# Patient Record
Sex: FEMALE | Race: WHITE | NOT HISPANIC OR LATINO | Employment: UNEMPLOYED | URBAN - METROPOLITAN AREA
[De-identification: names, ages, dates, MRNs, and addresses within clinical notes are randomized per-mention and may not be internally consistent; named-entity substitution may affect disease eponyms.]

---

## 2021-09-27 ENCOUNTER — TELEPHONE (OUTPATIENT)
Dept: NEUROLOGY | Facility: CLINIC | Age: 56
End: 2021-09-27

## 2023-05-23 ENCOUNTER — HOSPITAL ENCOUNTER (EMERGENCY)
Facility: HOSPITAL | Age: 58
Discharge: HOME | End: 2023-05-23
Attending: STUDENT IN AN ORGANIZED HEALTH CARE EDUCATION/TRAINING PROGRAM
Payer: MEDICAID

## 2023-05-23 VITALS
DIASTOLIC BLOOD PRESSURE: 77 MMHG | WEIGHT: 160 LBS | HEIGHT: 60 IN | BODY MASS INDEX: 31.41 KG/M2 | TEMPERATURE: 98 F | RESPIRATION RATE: 20 BRPM | OXYGEN SATURATION: 99 % | SYSTOLIC BLOOD PRESSURE: 128 MMHG | HEART RATE: 60 BPM

## 2023-05-23 DIAGNOSIS — M20.012 MALLET FINGER, LEFT: Primary | ICD-10-CM

## 2023-05-23 PROCEDURE — 25000003 PHARM REV CODE 250: Performed by: STUDENT IN AN ORGANIZED HEALTH CARE EDUCATION/TRAINING PROGRAM

## 2023-05-23 PROCEDURE — 99283 EMERGENCY DEPT VISIT LOW MDM: CPT

## 2023-05-23 RX ORDER — ACETAMINOPHEN 500 MG
1000 TABLET ORAL
Status: COMPLETED | OUTPATIENT
Start: 2023-05-23 | End: 2023-05-23

## 2023-05-23 RX ORDER — IBUPROFEN 400 MG/1
400 TABLET ORAL
Status: COMPLETED | OUTPATIENT
Start: 2023-05-23 | End: 2023-05-23

## 2023-05-23 RX ADMIN — ACETAMINOPHEN 1000 MG: 500 TABLET, FILM COATED ORAL at 07:05

## 2023-05-23 RX ADMIN — IBUPROFEN 400 MG: 400 TABLET ORAL at 07:05

## 2023-05-24 NOTE — ED PROVIDER NOTES
Encounter Date: 5/23/2023       History   No chief complaint on file.    57-year-old female presents for acute onset left 5th DI P pain after being pushed around by her dog on a leash.  She does not remember the exact mechanism of the injury.  The pain is worse when her fingers held in flexion, but improves when she forces the DI P into extension with a splint.  She denies other injuries.    Review of patient's allergies indicates:  No Known Allergies  Past Medical History:   Diagnosis Date    Diverticulitis      Past Surgical History:   Procedure Laterality Date    hemorroid surgery      HYSTERECTOMY      SHOULDER SURGERY       No family history on file.  Social History     Tobacco Use    Smoking status: Every Day     Packs/day: 1.00     Years: 35.00     Pack years: 35.00     Types: Cigarettes   Substance Use Topics    Alcohol use: No    Drug use: No     Review of Systems   Constitutional:  Negative for activity change, appetite change, chills, fever and unexpected weight change.   HENT:  Negative for dental problem and drooling.    Eyes:  Negative for discharge and itching.   Respiratory:  Negative for cough, chest tightness, shortness of breath, wheezing and stridor.    Cardiovascular:  Negative for chest pain, palpitations and leg swelling.   Gastrointestinal:  Negative for abdominal distention, abdominal pain, diarrhea and nausea.   Genitourinary:  Negative for difficulty urinating, dysuria, frequency and urgency.   Musculoskeletal:  Positive for arthralgias. Negative for back pain, gait problem and joint swelling.   Neurological:  Negative for dizziness, syncope, numbness and headaches.   Psychiatric/Behavioral:  Negative for agitation, behavioral problems and confusion.      Physical Exam     Initial Vitals [05/23/23 1903]   BP Pulse Resp Temp SpO2   (!) 121/100 79 20 99.4 °F (37.4 °C) 96 %      MAP       --         Physical Exam    Nursing note and vitals reviewed.  Constitutional: She appears well-developed  and well-nourished. She is not diaphoretic.   HENT:   Head: Normocephalic and atraumatic.   Mouth/Throat: Oropharynx is clear and moist.   Eyes: EOM are normal. Pupils are equal, round, and reactive to light. Right eye exhibits no discharge. Left eye exhibits no discharge.   Neck: No tracheal deviation present.   Normal range of motion.  Cardiovascular:  Normal rate, regular rhythm and intact distal pulses.           Pulmonary/Chest: No respiratory distress. She has no wheezes. She exhibits no tenderness.   Abdominal: Abdomen is soft. She exhibits no distension. There is no abdominal tenderness.   Musculoskeletal:         General: Tenderness present. No edema. Normal range of motion.      Cervical back: Normal range of motion.     Neurological: She is alert and oriented to person, place, and time. She has normal strength. No cranial nerve deficit or sensory deficit. GCS eye subscore is 4. GCS verbal subscore is 5. GCS motor subscore is 6.   Skin: Skin is warm and dry. No rash noted.   Psychiatric: She has a normal mood and affect. Her behavior is normal. Thought content normal.       ED Course   Procedures  Labs Reviewed - No data to display       Imaging Results              X-Ray Hand 3 view Left (Final result)  Result time 05/23/23 19:39:01      Final result by Sue Rocha DO (05/23/23 19:39:01)                   Narrative:    Left hand radiographs: 5/23/2023 7:38 PM CDT    TECHNIQUE: AP, lateral, oblique images of the left hand were obtained.    History: 57 years  old Female with Injury.    COMPARISON: None available    FINDINGS: The carpal arcs appear to be intact. The soft tissues are grossly unremarkable. There are no findings to suggest an acute fracture within the left hand. No gross radiopaque foreign body is seen. There is flexion of the left fifth digit which could be due to patient positioning but can also be seen with ligamentous injury.    IMPRESSION: There are no findings to suggest an acute  fracture within the left hand.        Electronically signed by:  Sue Rocha DO  5/23/2023 7:39 PM CDT Workstation: [id number]                                     Medications   acetaminophen tablet 1,000 mg (1,000 mg Oral Given 5/23/23 1922)   ibuprofen tablet 400 mg (400 mg Oral Given 5/23/23 1922)                            Left 5th pinky DI P held in slight flexion, patient unable to extend it.  Vitals normal.  No other injuries.  X-ray without evidence of acute fracture.  Injury pattern and exam most consistent with mallet finger.  Patient placed in splint, advised to follow up with hand surgery and to keep the finger in extension for 6-8 weeks.    Clinical Impression:   Final diagnoses:  [M20.012] Mallet finger, left (Primary)        ED Disposition Condition    Discharge Stable          ED Prescriptions    None       Follow-up Information       Follow up With Specialties Details Why Contact Info    Paul Nguyen MD Hand Surgery, Orthopedic Surgery Call in 1 day To set up a follow-up appointment, To recheck today's symptoms [address] [place] [address] Drive  [facility] [address] [phone]               Kiran Dean MD  05/24/23 3963

## 2023-05-24 NOTE — DISCHARGE INSTRUCTIONS
You have an injury to the extensor tendon of the pinky finger of your left hand. It's called mallet finger. Google Mallet Finger splints and purchase one that keeps your finger in extension for 6-8 weeks. Do not allow your finger to bend, even when you shower. Follow up with a hand surgeon for further evaluation.    Thank you for coming to our Emergency Department today. It is important to remember that some problems or medical conditions are difficult to diagnose and may not be found during your Emergency Department visit.     Be sure to follow up with your primary care doctor and review all labs/imaging/tests that were performed during your ER visit with them. Some labs/tests may be outside of the normal range and require non-emergent follow-up and further investigation to help diagnose/exclude/prevent complications or other potentially serious medical conditions that were not addressed during your ER visit.    If you do not have a primary care doctor, you may contact the one listed on your discharge paperwork or you may also call the [facility] Appointment Desk at [phone] to schedule an appointment and establish care with one. Another resources for finding primary care physicians: [email or web address] It is important to your health that you have a primary care doctor.    Please take all medications as directed. All medications may potentially have side-effects and it is impossible to predict which medications may give you side-effects or what side-effects (if any) they will give you. If you feel that you are having a negative effect or side-effect of any medication you should immediately stop taking them and seek medical attention. If you feel that you are having a life-threatening reaction call 911.    Return to the ER with any questions/concerns, new/concerning symptoms, worsening or failure to improve.     Do not drive, swim, climb to height, take a bath, operate heavy machinery, drink alcohol or  take potentially sedating medications, sign any legal documents or make any important decisions for 24 hours if you have received any pain medications, sedatives or mood altering drugs during your ER visit or within 24 hours of taking them if they have been prescribed to you.     You can find additional resources for Dentists, hearing aids, durable medical equipment, low cost pharmacies and other resources at [email or web address]

## 2023-10-22 ENCOUNTER — HOSPITAL ENCOUNTER (EMERGENCY)
Facility: HOSPITAL | Age: 58
Discharge: HOME | End: 2023-10-22
Attending: EMERGENCY MEDICINE
Payer: MEDICAID

## 2023-10-22 VITALS
TEMPERATURE: 98 F | WEIGHT: 147 LBS | BODY MASS INDEX: 28.71 KG/M2 | HEART RATE: 62 BPM | RESPIRATION RATE: 20 BRPM | OXYGEN SATURATION: 97 % | SYSTOLIC BLOOD PRESSURE: 171 MMHG | DIASTOLIC BLOOD PRESSURE: 87 MMHG

## 2023-10-22 DIAGNOSIS — K52.9 GASTROENTERITIS: Primary | ICD-10-CM

## 2023-10-22 LAB
ALBUMIN SERPL BCP-MCNC: 4.8 G/DL (ref 3.5–5.2)
ALP SERPL-CCNC: 89 U/L (ref 55–135)
ALT SERPL W/O P-5'-P-CCNC: 21 U/L (ref 10–44)
ANION GAP SERPL CALC-SCNC: 6 MMOL/L (ref 8–16)
AST SERPL-CCNC: 17 U/L (ref 10–40)
BASOPHILS # BLD AUTO: 0.06 K/UL (ref 0–0.2)
BASOPHILS NFR BLD: 0.6 % (ref 0–1.9)
BILIRUB SERPL-MCNC: 0.5 MG/DL (ref 0.1–1)
BILIRUB UR QL STRIP: NEGATIVE
BUN SERPL-MCNC: 13 MG/DL (ref 6–20)
CALCIUM SERPL-MCNC: 10 MG/DL (ref 8.7–10.5)
CHLORIDE SERPL-SCNC: 108 MMOL/L (ref 95–110)
CLARITY UR: CLEAR
CO2 SERPL-SCNC: 26 MMOL/L (ref 23–29)
COLOR UR: YELLOW
CREAT SERPL-MCNC: 0.7 MG/DL (ref 0.5–1.4)
CREAT SERPL-MCNC: 0.8 MG/DL (ref 0.5–1.4)
DIFFERENTIAL METHOD: NORMAL
EOSINOPHIL # BLD AUTO: 0.2 K/UL (ref 0–0.5)
EOSINOPHIL NFR BLD: 1.5 % (ref 0–8)
ERYTHROCYTE [DISTWIDTH] IN BLOOD BY AUTOMATED COUNT: 13.5 % (ref 11.5–14.5)
EST. GFR  (NO RACE VARIABLE): >60 ML/MIN/1.73 M^2
GLUCOSE SERPL-MCNC: 108 MG/DL (ref 70–110)
GLUCOSE UR QL STRIP: NEGATIVE
HCT VFR BLD AUTO: 47.9 % (ref 37–48.5)
HGB BLD-MCNC: 16 G/DL (ref 12–16)
HGB UR QL STRIP: NEGATIVE
IMM GRANULOCYTES # BLD AUTO: 0.03 K/UL (ref 0–0.04)
IMM GRANULOCYTES NFR BLD AUTO: 0.3 % (ref 0–0.5)
KETONES UR QL STRIP: NEGATIVE
LEUKOCYTE ESTERASE UR QL STRIP: NEGATIVE
LIPASE SERPL-CCNC: 34 U/L (ref 4–60)
LYMPHOCYTES # BLD AUTO: 3.5 K/UL (ref 1–4.8)
LYMPHOCYTES NFR BLD: 33.1 % (ref 18–48)
MAGNESIUM SERPL-MCNC: 1.8 MG/DL (ref 1.6–2.6)
MCH RBC QN AUTO: 30.9 PG (ref 27–31)
MCHC RBC AUTO-ENTMCNC: 33.4 G/DL (ref 32–36)
MCV RBC AUTO: 93 FL (ref 82–98)
MONOCYTES # BLD AUTO: 0.8 K/UL (ref 0.3–1)
MONOCYTES NFR BLD: 7.4 % (ref 4–15)
NEUTROPHILS # BLD AUTO: 6 K/UL (ref 1.8–7.7)
NEUTROPHILS NFR BLD: 57.1 % (ref 38–73)
NITRITE UR QL STRIP: NEGATIVE
NRBC BLD-RTO: 0 /100 WBC
PH UR STRIP: 6 [PH] (ref 5–8)
PLATELET # BLD AUTO: 351 K/UL (ref 150–450)
PMV BLD AUTO: 10.1 FL (ref 9.2–12.9)
POTASSIUM SERPL-SCNC: 4.1 MMOL/L (ref 3.5–5.1)
PROT SERPL-MCNC: 8 G/DL (ref 6–8.4)
PROT UR QL STRIP: NEGATIVE
RBC # BLD AUTO: 5.18 M/UL (ref 4–5.4)
SAMPLE: NORMAL
SODIUM SERPL-SCNC: 140 MMOL/L (ref 136–145)
SP GR UR STRIP: 1.01 (ref 1–1.03)
URN SPEC COLLECT METH UR: NORMAL
UROBILINOGEN UR STRIP-ACNC: NEGATIVE EU/DL
WBC # BLD AUTO: 10.5 K/UL (ref 3.9–12.7)

## 2023-10-22 PROCEDURE — 25500020 PHARM REV CODE 255: Performed by: EMERGENCY MEDICINE

## 2023-10-22 PROCEDURE — 83735 ASSAY OF MAGNESIUM: CPT | Performed by: EMERGENCY MEDICINE

## 2023-10-22 PROCEDURE — 80053 COMPREHEN METABOLIC PANEL: CPT | Performed by: EMERGENCY MEDICINE

## 2023-10-22 PROCEDURE — 96360 HYDRATION IV INFUSION INIT: CPT

## 2023-10-22 PROCEDURE — 85025 COMPLETE CBC W/AUTO DIFF WBC: CPT | Performed by: EMERGENCY MEDICINE

## 2023-10-22 PROCEDURE — 99285 EMERGENCY DEPT VISIT HI MDM: CPT | Mod: 25

## 2023-10-22 PROCEDURE — 83690 ASSAY OF LIPASE: CPT | Performed by: EMERGENCY MEDICINE

## 2023-10-22 PROCEDURE — 25000003 PHARM REV CODE 250: Performed by: EMERGENCY MEDICINE

## 2023-10-22 PROCEDURE — 82565 ASSAY OF CREATININE: CPT | Mod: 59

## 2023-10-22 PROCEDURE — 81003 URINALYSIS AUTO W/O SCOPE: CPT | Performed by: EMERGENCY MEDICINE

## 2023-10-22 RX ADMIN — SODIUM CHLORIDE 1000 ML: 0.9 INJECTION, SOLUTION INTRAVENOUS at 11:10

## 2023-10-22 RX ADMIN — IOHEXOL 100 ML: 350 INJECTION, SOLUTION INTRAVENOUS at 12:10

## 2023-10-22 NOTE — ED PROVIDER NOTES
"Encounter Date: 10/22/2023       History     Chief Complaint   Patient presents with    Hematuria    Abdominal Pain     Reports "dark, bad smelling" urine since Tuesday. Lower Abd pain as well.      58-year-old female presents emergency department with complaint of noticing blood in her urine earlier in the week, and watery brown diarrhea for the last week.  Patient denies any associated nausea vomiting or fevers.  Patient is unsure of what makes symptoms better or worse.  Reports that she has had a previous history of kidney stones and pyelonephritis.      Review of patient's allergies indicates:  No Known Allergies  Past Medical History:   Diagnosis Date    Diverticulitis      Past Surgical History:   Procedure Laterality Date    hemorroid surgery      HYSTERECTOMY      SHOULDER SURGERY       No family history on file.  Social History     Tobacco Use    Smoking status: Every Day     Current packs/day: 1.00     Average packs/day: 1 pack/day for 35.0 years (35.0 ttl pk-yrs)     Types: Cigarettes   Substance Use Topics    Alcohol use: No    Drug use: No     Review of Systems   Constitutional:  Negative for fever.   HENT: Negative.     Respiratory: Negative.     Cardiovascular: Negative.    Gastrointestinal:  Positive for abdominal pain and diarrhea.   Genitourinary:  Positive for hematuria.   Musculoskeletal: Negative.    Hematological: Negative.    All other systems reviewed and are negative.      Physical Exam     Initial Vitals [10/22/23 1041]   BP Pulse Resp Temp SpO2   (!) 181/98 65 20 98.4 °F (36.9 °C) 98 %      MAP       --         Physical Exam    Nursing note and vitals reviewed.  Constitutional: She appears well-developed and well-nourished.   HENT:   Head: Normocephalic and atraumatic.   Right Ear: External ear normal.   Left Ear: External ear normal.   Eyes: Conjunctivae and EOM are normal. Pupils are equal, round, and reactive to light.   Neck:   Normal range of motion.  Cardiovascular:  Normal rate, " regular rhythm and intact distal pulses.           Pulmonary/Chest: Breath sounds normal.   Abdominal: Abdomen is soft. Bowel sounds are normal. She exhibits no distension. There is abdominal tenderness.   Tenderness to the left upper quadrant and left lower quadrant on exam no abdominal distension bowel sounds normal x4 quads   Musculoskeletal:         General: Normal range of motion.      Cervical back: Normal range of motion.     Neurological: She is alert and oriented to person, place, and time. She has normal strength.   Skin: Skin is warm.         ED Course   Procedures  Labs Reviewed   COMPREHENSIVE METABOLIC PANEL - Abnormal; Notable for the following components:       Result Value    Anion Gap 6 (*)     All other components within normal limits   URINALYSIS, REFLEX TO URINE CULTURE    Narrative:     Specimen Source->Urine   CBC W/ AUTO DIFFERENTIAL   LIPASE   MAGNESIUM   ISTAT CREATININE          Imaging Results              CT Abdomen Pelvis With Contrast (Final result)  Result time 10/22/23 12:57:55      Final result by Yamel Hampton MD (10/22/23 12:57:55)                   Narrative:    EXAMINATION:  CT ABDOMEN PELVIS WITH CONTRAST    CLINICAL INDICATION: Female, 58 years old. Abdominal pain, hematuria,    TECHNIQUE: Helical CT scan examination of the abdomen and pelvis is performed from the domes of the diaphragm to the pubic symphysis with the administration of intravenous contrast material.    CONTRAST: 100 mL mL of Omnipaque 350 IV.    COMPARISON: None    FINDINGS:  Lower Chest: Visualized lung bases are clear. Heart is normal in size. No pericardial or pleural effusion.    Liver: Normal in size and contour. No focal lesion.    Bile Ducts: Normal caliber.    Gallbladder: No stones, wall thickening, or pericholecystic fluid.    Pancreas: Normal appearance without focal lesion.    Spleen: Normal in size and contour.    Adrenals: Normal configuration.    Kidneys and Ureters: Normal size and  contour. No hydronephrosis.    Bladder: Normal in appearance.    Bowel:  Stomach: Unremarkable.  Small Intestine: Unremarkable.  Appendix: No inflammatory changes.  Colon: Unremarkable.    Vessels: Abdominal aorta and inferior vena cava are normal in course and caliber.    Reproductive Organs: Uterus is absent.    Lymph Nodes: No pathologic mesenteric or retroperitoneal lymph nodes.    Peritoneum: No free air, free fluid, or fluid collection.    Abdominal Wall: No hernia or mass.    Musculoskeletal: No acute abnormality or suspicious bony lesion.    IMPRESSION:  No acute or significant abnormality seen in the abdomen or pelvis  Prior hysterectomy.    This exam was performed according to our departmental dose-optimization program which includes automated exposure control, adjustment of the mA and/or kV according to patient size and/or use of iterative reconstruction technique.    Electronically signed by:  Yamel Hampton MD  10/22/2023 12:57 PM CDT Workstation: [id number]                                     Medications   sodium chloride 0.9% bolus 1,000 mL 1,000 mL (0 mLs Intravenous Stopped 10/22/23 1210)   iohexoL (OMNIPAQUE 350) injection 100 mL (100 mLs Intravenous Given 10/22/23 1222)     Medical Decision Making  58-year-old female presents emergency department with complaint of noticing blood in her urine earlier in the week, and watery brown diarrhea for the last week.  Patient denies any associated nausea vomiting or fevers.  Patient is unsure of what makes symptoms better or worse.  Reports that she has had a previous history of kidney stones and pyelonephritis.    Considerations include colitis, pyelonephritis, UTI, ureterolithiasis, bowel obstruction, diverticulitis, electrolyte abnormalities    58-year-old female presents emergency department complaint of hematuria earlier in the week, low back pain, and abdominal pain.  Patient reports that she is had loose stools denies any bloody or mucousy stools.   On exam patient has tenderness to the left upper quadrant and left lower quadrant therefore further evaluation was performed with labs which were unremarkable urinalysis reveals no signs of infection or occult blood suggestive of infection or ureterolithiasis.  CT imaging was performed and again unremarkable.  Patient's exam and labs are completely unremarkable.  I will discharge this patient home at this time that she is not had a bowel movement to send for stool studies of given detailed return precautions patient was instructed on brat  diet follow-up with pcp     Amount and/or Complexity of Data Reviewed  External Data Reviewed: labs and notes.  Labs: ordered. Decision-making details documented in ED Course.  Radiology: ordered. Decision-making details documented in ED Course.    Risk  Prescription drug management.                               Clinical Impression:   Final diagnoses:  [K52.9] Gastroenteritis (Primary)        ED Disposition Condition    Discharge Stable          ED Prescriptions    None       Follow-up Information       Follow up With Specialties Details Why Contact Info    Meliton Valadez MD Family Medicine Schedule an appointment as soon as possible for a visit in 2 days  [address] [address]  [phone]               Aissatou Bran FNP  10/22/23 1557

## 2023-11-30 ENCOUNTER — HOSPITAL ENCOUNTER (EMERGENCY)
Facility: HOSPITAL | Age: 58
Discharge: HOME | End: 2023-11-30
Attending: EMERGENCY MEDICINE
Payer: MEDICAID

## 2023-11-30 VITALS
TEMPERATURE: 98 F | BODY MASS INDEX: 28.32 KG/M2 | OXYGEN SATURATION: 96 % | SYSTOLIC BLOOD PRESSURE: 126 MMHG | DIASTOLIC BLOOD PRESSURE: 71 MMHG | WEIGHT: 145 LBS | HEART RATE: 69 BPM | RESPIRATION RATE: 17 BRPM

## 2023-11-30 DIAGNOSIS — R51.9 NONINTRACTABLE HEADACHE, UNSPECIFIED CHRONICITY PATTERN, UNSPECIFIED HEADACHE TYPE: Primary | ICD-10-CM

## 2023-11-30 LAB
ALBUMIN SERPL BCP-MCNC: 4.7 G/DL (ref 3.5–5.2)
ALP SERPL-CCNC: 87 U/L (ref 55–135)
ALT SERPL W/O P-5'-P-CCNC: 16 U/L (ref 10–44)
ANION GAP SERPL CALC-SCNC: 5 MMOL/L (ref 8–16)
AST SERPL-CCNC: 15 U/L (ref 10–40)
BASOPHILS # BLD AUTO: 0.06 K/UL (ref 0–0.2)
BASOPHILS NFR BLD: 0.6 % (ref 0–1.9)
BILIRUB SERPL-MCNC: 0.5 MG/DL (ref 0.1–1)
BUN SERPL-MCNC: 12 MG/DL (ref 6–20)
CALCIUM SERPL-MCNC: 9.8 MG/DL (ref 8.7–10.5)
CHLORIDE SERPL-SCNC: 106 MMOL/L (ref 95–110)
CO2 SERPL-SCNC: 27 MMOL/L (ref 23–29)
CREAT SERPL-MCNC: 0.7 MG/DL (ref 0.5–1.4)
CREAT SERPL-MCNC: 0.8 MG/DL (ref 0.5–1.4)
DIFFERENTIAL METHOD: ABNORMAL
EOSINOPHIL # BLD AUTO: 0.2 K/UL (ref 0–0.5)
EOSINOPHIL NFR BLD: 1.7 % (ref 0–8)
ERYTHROCYTE [DISTWIDTH] IN BLOOD BY AUTOMATED COUNT: 13.7 % (ref 11.5–14.5)
EST. GFR  (NO RACE VARIABLE): >60 ML/MIN/1.73 M^2
GLUCOSE SERPL-MCNC: 106 MG/DL (ref 70–110)
HCT VFR BLD AUTO: 49.7 % (ref 37–48.5)
HGB BLD-MCNC: 16.5 G/DL (ref 12–16)
IMM GRANULOCYTES # BLD AUTO: 0.02 K/UL (ref 0–0.04)
IMM GRANULOCYTES NFR BLD AUTO: 0.2 % (ref 0–0.5)
LYMPHOCYTES # BLD AUTO: 3.7 K/UL (ref 1–4.8)
LYMPHOCYTES NFR BLD: 38.3 % (ref 18–48)
MCH RBC QN AUTO: 30.3 PG (ref 27–31)
MCHC RBC AUTO-ENTMCNC: 33.2 G/DL (ref 32–36)
MCV RBC AUTO: 91 FL (ref 82–98)
MONOCYTES # BLD AUTO: 0.7 K/UL (ref 0.3–1)
MONOCYTES NFR BLD: 7.3 % (ref 4–15)
NEUTROPHILS # BLD AUTO: 5 K/UL (ref 1.8–7.7)
NEUTROPHILS NFR BLD: 51.9 % (ref 38–73)
NRBC BLD-RTO: 0 /100 WBC
PLATELET # BLD AUTO: 363 K/UL (ref 150–450)
PMV BLD AUTO: 9.7 FL (ref 9.2–12.9)
POTASSIUM SERPL-SCNC: 4 MMOL/L (ref 3.5–5.1)
PROT SERPL-MCNC: 7.8 G/DL (ref 6–8.4)
RBC # BLD AUTO: 5.44 M/UL (ref 4–5.4)
SAMPLE: NORMAL
SODIUM SERPL-SCNC: 138 MMOL/L (ref 136–145)
WBC # BLD AUTO: 9.62 K/UL (ref 3.9–12.7)

## 2023-11-30 PROCEDURE — 99285 EMERGENCY DEPT VISIT HI MDM: CPT | Mod: 25

## 2023-11-30 PROCEDURE — 82565 ASSAY OF CREATININE: CPT | Mod: 59

## 2023-11-30 PROCEDURE — 25500020 PHARM REV CODE 255: Performed by: EMERGENCY MEDICINE

## 2023-11-30 PROCEDURE — 80053 COMPREHEN METABOLIC PANEL: CPT | Performed by: EMERGENCY MEDICINE

## 2023-11-30 PROCEDURE — 85025 COMPLETE CBC W/AUTO DIFF WBC: CPT | Performed by: EMERGENCY MEDICINE

## 2023-11-30 RX ORDER — BUTALBITAL, ACETAMINOPHEN AND CAFFEINE 50; 325; 40 MG/1; MG/1; MG/1
1 TABLET ORAL EVERY 4 HOURS PRN
Qty: 20 TABLET | Refills: 0 | Status: SHIPPED | OUTPATIENT
Start: 2023-11-30

## 2023-11-30 RX ADMIN — IOHEXOL 50 ML: 350 INJECTION, SOLUTION INTRAVENOUS at 11:11

## 2023-11-30 NOTE — ED PROVIDER NOTES
Encounter Date: 11/30/2023       History     Chief Complaint   Patient presents with    Headache     X 2 weeks. History of craniotomy/tumor removal 10/2022      Patient presents complaining of headache.  Patient has history of craniotomy for meningioma removal in 2022 by Dr. Neves.  Patient noticed increasing tenderness to that area over the last 2 weeks.  No nausea or vomiting.  No one-sided numbness tingling or weakness.  She is noticed some pain behind the right eye.  The loss of vision.      Review of patient's allergies indicates:  No Known Allergies  Past Medical History:   Diagnosis Date    Diverticulitis      Past Surgical History:   Procedure Laterality Date    hemorroid surgery      HYSTERECTOMY      SHOULDER SURGERY       No family history on file.  Social History     Tobacco Use    Smoking status: Every Day     Current packs/day: 1.00     Average packs/day: 1 pack/day for 35.0 years (35.0 ttl pk-yrs)     Types: Cigarettes   Substance Use Topics    Alcohol use: No    Drug use: No     Review of Systems   All other systems reviewed and are negative.      Physical Exam     Initial Vitals [11/30/23 0958]   BP Pulse Resp Temp SpO2   131/68 73 20 98.1 °F (36.7 °C) 98 %      MAP       --         Physical Exam    Nursing note and vitals reviewed.  Constitutional: She appears well-developed and well-nourished.   Pleasant, polite   HENT:   Head: Normocephalic and atraumatic.   Eyes: EOM are normal.   Neck: Neck supple.   Normal range of motion.  Cardiovascular:  Normal rate, regular rhythm, normal heart sounds and intact distal pulses.           Pulmonary/Chest: Breath sounds normal. No respiratory distress.   Musculoskeletal:         General: Normal range of motion.      Cervical back: Normal range of motion and neck supple.     Neurological: She is alert and oriented to person, place, and time. She has normal strength.   Vision - Normal  Aphasia - Normal  Neglect - Normal  Pronator drift - Normal  Cerebellum -  "Normal  RUE strength - Normal  RLE strength - Normal  LUE strength - Normal  LLE strength - Normal   Skin: Skin is warm and dry. Capillary refill takes less than 2 seconds.   Psychiatric: She has a normal mood and affect. Her behavior is normal. Judgment and thought content normal.         ED Course   Procedures  Labs Reviewed   CBC W/ AUTO DIFFERENTIAL - Abnormal; Notable for the following components:       Result Value    RBC 5.44 (*)     Hemoglobin 16.5 (*)     Hematocrit 49.7 (*)     All other components within normal limits   COMPREHENSIVE METABOLIC PANEL - Abnormal; Notable for the following components:    Anion Gap 5 (*)     All other components within normal limits   ISTAT CREATININE   POCT CREATININE          Imaging Results              CT Head W Wo Contrast (Final result)  Result time 11/30/23 12:13:22      Final result by Edis Morgan MD (11/30/23 12:13:22)                   Narrative:    CMS MANDATED QUALITY DATA - CT RADIATION - 436    All CT scans at this facility utilize dose modulation, iterative reconstruction, and/or weight based dosing when appropriate to reduce radiation dose to as low as reasonably achievable.          Reason: Meningitis/CNS infection suspected reported headache for 2 weeks, history of craniotomy and "tumor removal" in 2022    TECHNIQUE: Head CT without and with 50 mL Omnipaque 350.    COMPARISON: 8/18/2018    FINDINGS:  Gray-white differentiation is maintained without hemorrhage, midline shift, or mass effect.    Postsurgical changes of right posterior parasagittal craniotomy evident. Focal encephalomalacia affects the midline superior right parietal lobe.    Following IV contrast, no abnormal intra-axial or extra-axial enhancement occurs.    The ventricles and cisterns are maintained.    Visualized sinuses are clear.    IMPRESSION:    1. No acute intracranial abnormality.  2. Postsurgical changes of right posterior parasagittal craniotomy with focal right frontal lobe " encephalomalacia deep to such.    Electronically signed by:  Edis Morgan MD  11/30/2023 12:13 PM [facility] Workstation: [id number]                                     Medications   iohexoL (OMNIPAQUE 350) injection 50 mL (50 mLs Intravenous Given 11/30/23 1131)     Medical Decision Making  Patient appears in no acute distress     Considerations include but are not limited to cerebral edema, infection, tumor     MDM    Patient presents for emergent evaluation of acute head pain that poses a threat to life and/or bodily function.    In the ED patient found to have acute head pain.    I ordered labs and personally reviewed them.  Labs significant for no acute abnormality.    I ordered CT scan and personally reviewed it and reviewed the radiologist interpretation.  CT significant for no acute abnormality.      Discharge MDM  I attempted to contact Dr. Donnelly group however unfortunately he is unavailable.  I advised patient's CT imaging with contrast is within normal limits and white blood cell count is normal.  I advised her she should follow up with Neurosurgery within the next few days and discuss her head pain with Dr. Donnelly .  Patient also requested that I put in a referral for a different neurosurgeon as she is had difficulty getting the appointments with  .  At this time I find patient is safe for discharge in no other emergent condition.  She will need follow up.  Patient was discharged in stable condition.  Detailed return precautions discussed.    Amount and/or Complexity of Data Reviewed  Labs: ordered.  Radiology: ordered.    Risk  Prescription drug management.                                      Clinical Impression:  Final diagnoses:  [R51.9] Nonintractable headache, unspecified chronicity pattern, unspecified headache type (Primary)          ED Disposition Condition    Discharge Stable          ED Prescriptions       Medication Sig Dispense Start Date End Date Auth. Provider     butalbital-acetaminophen-caffeine -40 mg (FIORICET, ESGIC) -40 mg per tablet Take 1 tablet by mouth every 4 (four) hours as needed for Pain. 20 tablet 11/30/2023 -- Hira Vicente MD          Follow-up Information       Follow up With Specialties Details Why Contact Info    Adrian Gray MD Neurosurgery Schedule an appointment as soon as possible for a visit in 2 days  [address] [place] [address]  [id number]  [facility]  [place] [id number] [phone]      Tony Nina, DO Neurosurgery Schedule an appointment as soon as possible for a visit in 1 week  [address] [place] [address] [address]  [address]  [facility] [address]  [phone]               Hira Vicente MD  11/30/23 3703

## 2023-12-14 ENCOUNTER — TELEPHONE (OUTPATIENT)
Dept: NEUROSURGERY | Facility: CLINIC | Age: 58
End: 2023-12-14
Payer: MEDICAID

## 2023-12-14 NOTE — TELEPHONE ENCOUNTER
Returned call to pt and informed that unfortunately, we are unable to schedule her insurance for new patients at this time. Pt voiced understanding.

## 2023-12-14 NOTE — TELEPHONE ENCOUNTER
----- Message from Jerry Rodarte sent at 12/14/2023  4:00 PM CST -----  Type: Need Medical Advice  Who Called:Patient   Best callback number: [phone]  Additional Information: Patient called to schedule a hospital f/u  appointment to see Dr Nina  Please call to further assist, Thanks.

## 2024-07-19 ENCOUNTER — HOSPITAL ENCOUNTER (INPATIENT)
Facility: HOSPITAL | Age: 59
LOS: 3 days | Discharge: HOME | DRG: 392 | End: 2024-07-22
Attending: EMERGENCY MEDICINE | Admitting: INTERNAL MEDICINE
Payer: MEDICAID

## 2024-07-19 DIAGNOSIS — R10.10 UPPER ABDOMINAL PAIN: ICD-10-CM

## 2024-07-19 DIAGNOSIS — R07.9 CHEST PAIN: ICD-10-CM

## 2024-07-19 DIAGNOSIS — K52.9 ACUTE COLITIS: Primary | ICD-10-CM

## 2024-07-19 PROBLEM — I10 ESSENTIAL HYPERTENSION: Status: ACTIVE | Noted: 2024-07-19

## 2024-07-19 LAB
ALBUMIN SERPL BCP-MCNC: 4.7 G/DL (ref 3.5–5.2)
ALP SERPL-CCNC: 106 U/L (ref 55–135)
ALT SERPL W/O P-5'-P-CCNC: 20 U/L (ref 10–44)
ANION GAP SERPL CALC-SCNC: 9 MMOL/L (ref 8–16)
AST SERPL-CCNC: 16 U/L (ref 10–40)
BASOPHILS # BLD AUTO: 0.05 K/UL (ref 0–0.2)
BASOPHILS NFR BLD: 0.3 % (ref 0–1.9)
BILIRUB SERPL-MCNC: 0.4 MG/DL (ref 0.1–1)
BNP SERPL-MCNC: 46 PG/ML (ref 0–99)
BUN SERPL-MCNC: 10 MG/DL (ref 6–20)
C DIFF GDH STL QL: NEGATIVE
C DIFF TOX A+B STL QL IA: NEGATIVE
CALCIUM SERPL-MCNC: 10.1 MG/DL (ref 8.7–10.5)
CHLORIDE SERPL-SCNC: 107 MMOL/L (ref 95–110)
CK SERPL-CCNC: 48 U/L (ref 20–180)
CO2 SERPL-SCNC: 26 MMOL/L (ref 23–29)
CREAT SERPL-MCNC: 0.7 MG/DL (ref 0.5–1.4)
DIFFERENTIAL METHOD BLD: ABNORMAL
EOSINOPHIL # BLD AUTO: 0 K/UL (ref 0–0.5)
EOSINOPHIL NFR BLD: 0.2 % (ref 0–8)
ERYTHROCYTE [DISTWIDTH] IN BLOOD BY AUTOMATED COUNT: 14 % (ref 11.5–14.5)
EST. GFR  (NO RACE VARIABLE): >60 ML/MIN/1.73 M^2
GLUCOSE SERPL-MCNC: 129 MG/DL (ref 70–110)
GROUP A STREP, MOLECULAR: NEGATIVE
HCT VFR BLD AUTO: 50.7 % (ref 37–48.5)
HGB BLD-MCNC: 16.6 G/DL (ref 12–16)
IMM GRANULOCYTES # BLD AUTO: 0.05 K/UL (ref 0–0.04)
IMM GRANULOCYTES NFR BLD AUTO: 0.3 % (ref 0–0.5)
INFLUENZA A, MOLECULAR: NEGATIVE
INFLUENZA B, MOLECULAR: NEGATIVE
LDH SERPL L TO P-CCNC: 0.86 MMOL/L (ref 0.5–2.2)
LIPASE SERPL-CCNC: 17 U/L (ref 4–60)
LYMPHOCYTES # BLD AUTO: 2.2 K/UL (ref 1–4.8)
LYMPHOCYTES NFR BLD: 14.5 % (ref 18–48)
MAGNESIUM SERPL-MCNC: 1.8 MG/DL (ref 1.6–2.6)
MCH RBC QN AUTO: 29.6 PG (ref 27–31)
MCHC RBC AUTO-ENTMCNC: 32.7 G/DL (ref 32–36)
MCV RBC AUTO: 91 FL (ref 82–98)
MONOCYTES # BLD AUTO: 0.7 K/UL (ref 0.3–1)
MONOCYTES NFR BLD: 4.3 % (ref 4–15)
NEUTROPHILS # BLD AUTO: 12.2 K/UL (ref 1.8–7.7)
NEUTROPHILS NFR BLD: 80.4 % (ref 38–73)
NRBC BLD-RTO: 0 /100 WBC
OB PNL STL: POSITIVE
PLATELET # BLD AUTO: 383 K/UL (ref 150–450)
PMV BLD AUTO: 10.4 FL (ref 9.2–12.9)
POTASSIUM SERPL-SCNC: 4 MMOL/L (ref 3.5–5.1)
PROT SERPL-MCNC: 8.1 G/DL (ref 6–8.4)
RBC # BLD AUTO: 5.6 M/UL (ref 4–5.4)
RV AG STL QL IA.RAPID: NEGATIVE
SAMPLE: NORMAL
SARS-COV-2 RDRP RESP QL NAA+PROBE: NEGATIVE
SODIUM SERPL-SCNC: 142 MMOL/L (ref 136–145)
SPECIMEN SOURCE: NORMAL
TROPONIN I SERPL HS-MCNC: 3.9 PG/ML (ref 0–14.9)
TROPONIN I SERPL HS-MCNC: 5.2 PG/ML (ref 0–14.9)
TSH SERPL DL<=0.005 MIU/L-ACNC: 2.25 UIU/ML (ref 0.34–5.6)
WBC # BLD AUTO: 15.24 K/UL (ref 3.9–12.7)
WBC #/AREA STL HPF: NORMAL /[HPF]

## 2024-07-19 PROCEDURE — G0378 HOSPITAL OBSERVATION PER HR: HCPCS

## 2024-07-19 PROCEDURE — 99900031 HC PATIENT EDUCATION (STAT)

## 2024-07-19 PROCEDURE — 84443 ASSAY THYROID STIM HORMONE: CPT | Performed by: NURSE PRACTITIONER

## 2024-07-19 PROCEDURE — 36415 COLL VENOUS BLD VENIPUNCTURE: CPT | Performed by: NURSE PRACTITIONER

## 2024-07-19 PROCEDURE — 85025 COMPLETE CBC W/AUTO DIFF WBC: CPT | Performed by: NURSE PRACTITIONER

## 2024-07-19 PROCEDURE — 96366 THER/PROPH/DIAG IV INF ADDON: CPT

## 2024-07-19 PROCEDURE — 84484 ASSAY OF TROPONIN QUANT: CPT | Mod: 91 | Performed by: EMERGENCY MEDICINE

## 2024-07-19 PROCEDURE — 84484 ASSAY OF TROPONIN QUANT: CPT | Performed by: NURSE PRACTITIONER

## 2024-07-19 PROCEDURE — 83880 ASSAY OF NATRIURETIC PEPTIDE: CPT | Performed by: NURSE PRACTITIONER

## 2024-07-19 PROCEDURE — 87045 FECES CULTURE AEROBIC BACT: CPT | Performed by: EMERGENCY MEDICINE

## 2024-07-19 PROCEDURE — 83735 ASSAY OF MAGNESIUM: CPT | Performed by: NURSE PRACTITIONER

## 2024-07-19 PROCEDURE — 25000003 PHARM REV CODE 250: Performed by: NURSE PRACTITIONER

## 2024-07-19 PROCEDURE — 63600175 PHARM REV CODE 636 W HCPCS: Performed by: EMERGENCY MEDICINE

## 2024-07-19 PROCEDURE — U0002 COVID-19 LAB TEST NON-CDC: HCPCS | Performed by: EMERGENCY MEDICINE

## 2024-07-19 PROCEDURE — 96361 HYDRATE IV INFUSION ADD-ON: CPT

## 2024-07-19 PROCEDURE — 96365 THER/PROPH/DIAG IV INF INIT: CPT

## 2024-07-19 PROCEDURE — 87449 NOS EACH ORGANISM AG IA: CPT | Mod: 91 | Performed by: EMERGENCY MEDICINE

## 2024-07-19 PROCEDURE — 87209 SMEAR COMPLEX STAIN: CPT | Performed by: EMERGENCY MEDICINE

## 2024-07-19 PROCEDURE — 36415 COLL VENOUS BLD VENIPUNCTURE: CPT | Performed by: EMERGENCY MEDICINE

## 2024-07-19 PROCEDURE — 80053 COMPREHEN METABOLIC PANEL: CPT | Performed by: NURSE PRACTITIONER

## 2024-07-19 PROCEDURE — 87425 ROTAVIRUS AG IA: CPT | Performed by: EMERGENCY MEDICINE

## 2024-07-19 PROCEDURE — 87324 CLOSTRIDIUM AG IA: CPT | Performed by: EMERGENCY MEDICINE

## 2024-07-19 PROCEDURE — 82272 OCCULT BLD FECES 1-3 TESTS: CPT | Performed by: EMERGENCY MEDICINE

## 2024-07-19 PROCEDURE — 63600175 PHARM REV CODE 636 W HCPCS: Performed by: NURSE PRACTITIONER

## 2024-07-19 PROCEDURE — 82550 ASSAY OF CK (CPK): CPT | Performed by: NURSE PRACTITIONER

## 2024-07-19 PROCEDURE — 87449 NOS EACH ORGANISM AG IA: CPT | Performed by: EMERGENCY MEDICINE

## 2024-07-19 PROCEDURE — 83690 ASSAY OF LIPASE: CPT | Performed by: NURSE PRACTITIONER

## 2024-07-19 PROCEDURE — 80307 DRUG TEST PRSMV CHEM ANLYZR: CPT | Performed by: EMERGENCY MEDICINE

## 2024-07-19 PROCEDURE — 96376 TX/PRO/DX INJ SAME DRUG ADON: CPT

## 2024-07-19 PROCEDURE — 12000002 HC ACUTE/MED SURGE SEMI-PRIVATE ROOM

## 2024-07-19 PROCEDURE — 87046 STOOL CULTR AEROBIC BACT EA: CPT | Mod: 59 | Performed by: EMERGENCY MEDICINE

## 2024-07-19 PROCEDURE — 25000003 PHARM REV CODE 250: Performed by: INTERNAL MEDICINE

## 2024-07-19 PROCEDURE — 87040 BLOOD CULTURE FOR BACTERIA: CPT | Mod: 59 | Performed by: EMERGENCY MEDICINE

## 2024-07-19 PROCEDURE — 25500020 PHARM REV CODE 255: Performed by: EMERGENCY MEDICINE

## 2024-07-19 PROCEDURE — 89055 LEUKOCYTE ASSESSMENT FECAL: CPT | Performed by: EMERGENCY MEDICINE

## 2024-07-19 PROCEDURE — 87651 STREP A DNA AMP PROBE: CPT | Performed by: EMERGENCY MEDICINE

## 2024-07-19 PROCEDURE — 81003 URINALYSIS AUTO W/O SCOPE: CPT | Mod: 59 | Performed by: EMERGENCY MEDICINE

## 2024-07-19 PROCEDURE — 96375 TX/PRO/DX INJ NEW DRUG ADDON: CPT

## 2024-07-19 PROCEDURE — 87502 INFLUENZA DNA AMP PROBE: CPT | Performed by: EMERGENCY MEDICINE

## 2024-07-19 PROCEDURE — 99285 EMERGENCY DEPT VISIT HI MDM: CPT | Mod: 25

## 2024-07-19 PROCEDURE — 25000003 PHARM REV CODE 250: Performed by: EMERGENCY MEDICINE

## 2024-07-19 PROCEDURE — 94761 N-INVAS EAR/PLS OXIMETRY MLT: CPT

## 2024-07-19 RX ORDER — OXYCODONE HYDROCHLORIDE 5 MG/1
5 TABLET ORAL EVERY 6 HOURS PRN
Status: DISCONTINUED | OUTPATIENT
Start: 2024-07-19 | End: 2024-07-22 | Stop reason: HOSPADM

## 2024-07-19 RX ORDER — SODIUM CHLORIDE 0.9 % (FLUSH) 0.9 %
10 SYRINGE (ML) INJECTION
Status: DISCONTINUED | OUTPATIENT
Start: 2024-07-19 | End: 2024-07-22 | Stop reason: HOSPADM

## 2024-07-19 RX ORDER — SODIUM,POTASSIUM PHOSPHATES 280-250MG
2 POWDER IN PACKET (EA) ORAL
Status: DISCONTINUED | OUTPATIENT
Start: 2024-07-19 | End: 2024-07-22 | Stop reason: HOSPADM

## 2024-07-19 RX ORDER — SODIUM CHLORIDE, SODIUM LACTATE, POTASSIUM CHLORIDE, CALCIUM CHLORIDE 600; 310; 30; 20 MG/100ML; MG/100ML; MG/100ML; MG/100ML
INJECTION, SOLUTION INTRAVENOUS CONTINUOUS
Status: ACTIVE | OUTPATIENT
Start: 2024-07-19 | End: 2024-07-20

## 2024-07-19 RX ORDER — MORPHINE SULFATE 2 MG/ML
2 INJECTION, SOLUTION INTRAMUSCULAR; INTRAVENOUS
Status: COMPLETED | OUTPATIENT
Start: 2024-07-19 | End: 2024-07-19

## 2024-07-19 RX ORDER — DICLOFENAC SODIUM 10 MG/G
1 GEL TOPICAL 4 TIMES DAILY
COMMUNITY
Start: 2024-07-16

## 2024-07-19 RX ORDER — DOCUSATE SODIUM 100 MG/1
100 CAPSULE, LIQUID FILLED ORAL 2 TIMES DAILY
Status: DISCONTINUED | OUTPATIENT
Start: 2024-07-19 | End: 2024-07-19

## 2024-07-19 RX ORDER — HYDRALAZINE HYDROCHLORIDE 20 MG/ML
10 INJECTION INTRAMUSCULAR; INTRAVENOUS EVERY 4 HOURS PRN
Status: DISCONTINUED | OUTPATIENT
Start: 2024-07-19 | End: 2024-07-20

## 2024-07-19 RX ORDER — ONDANSETRON HYDROCHLORIDE 2 MG/ML
4 INJECTION, SOLUTION INTRAVENOUS
Status: COMPLETED | OUTPATIENT
Start: 2024-07-19 | End: 2024-07-19

## 2024-07-19 RX ORDER — LANOLIN ALCOHOL/MO/W.PET/CERES
800 CREAM (GRAM) TOPICAL
Status: DISCONTINUED | OUTPATIENT
Start: 2024-07-19 | End: 2024-07-22 | Stop reason: HOSPADM

## 2024-07-19 RX ORDER — METHOCARBAMOL 500 MG/1
500 TABLET, FILM COATED ORAL 3 TIMES DAILY PRN
COMMUNITY
Start: 2024-07-16 | End: 2024-08-15

## 2024-07-19 RX ORDER — MORPHINE SULFATE 2 MG/ML
1 INJECTION, SOLUTION INTRAMUSCULAR; INTRAVENOUS EVERY 6 HOURS PRN
Status: DISCONTINUED | OUTPATIENT
Start: 2024-07-19 | End: 2024-07-22 | Stop reason: HOSPADM

## 2024-07-19 RX ORDER — FAMOTIDINE 20 MG/1
20 TABLET, FILM COATED ORAL 2 TIMES DAILY
Status: DISCONTINUED | OUTPATIENT
Start: 2024-07-19 | End: 2024-07-20

## 2024-07-19 RX ORDER — ATORVASTATIN CALCIUM 80 MG/1
80 TABLET, FILM COATED ORAL DAILY
COMMUNITY
Start: 2024-07-16

## 2024-07-19 RX ORDER — LORAZEPAM 2 MG/ML
0.5 INJECTION INTRAMUSCULAR ONCE
Status: COMPLETED | OUTPATIENT
Start: 2024-07-19 | End: 2024-07-19

## 2024-07-19 RX ORDER — ACETAMINOPHEN 325 MG/1
650 TABLET ORAL EVERY 4 HOURS PRN
Status: DISCONTINUED | OUTPATIENT
Start: 2024-07-19 | End: 2024-07-22 | Stop reason: HOSPADM

## 2024-07-19 RX ORDER — MELOXICAM 15 MG/1
15 TABLET ORAL DAILY PRN
COMMUNITY
Start: 2023-08-11

## 2024-07-19 RX ORDER — ONDANSETRON HYDROCHLORIDE 2 MG/ML
4 INJECTION, SOLUTION INTRAVENOUS EVERY 6 HOURS PRN
Status: DISCONTINUED | OUTPATIENT
Start: 2024-07-19 | End: 2024-07-22 | Stop reason: HOSPADM

## 2024-07-19 RX ORDER — HYDRALAZINE HYDROCHLORIDE 20 MG/ML
5 INJECTION INTRAMUSCULAR; INTRAVENOUS ONCE
Status: COMPLETED | OUTPATIENT
Start: 2024-07-19 | End: 2024-07-19

## 2024-07-19 RX ORDER — ACETAMINOPHEN 500 MG
1000 TABLET ORAL
Status: COMPLETED | OUTPATIENT
Start: 2024-07-19 | End: 2024-07-19

## 2024-07-19 RX ADMIN — HYDRALAZINE HYDROCHLORIDE 5 MG: 20 INJECTION INTRAMUSCULAR; INTRAVENOUS at 04:07

## 2024-07-19 RX ADMIN — MORPHINE SULFATE 2 MG: 2 INJECTION, SOLUTION INTRAMUSCULAR; INTRAVENOUS at 02:07

## 2024-07-19 RX ADMIN — PIPERACILLIN SODIUM AND TAZOBACTAM SODIUM 4.5 G: 4; .5 INJECTION, POWDER, LYOPHILIZED, FOR SOLUTION INTRAVENOUS at 02:07

## 2024-07-19 RX ADMIN — OXYCODONE HYDROCHLORIDE 5 MG: 5 TABLET ORAL at 10:07

## 2024-07-19 RX ADMIN — ACETAMINOPHEN 1000 MG: 500 TABLET ORAL at 11:07

## 2024-07-19 RX ADMIN — PIPERACILLIN SODIUM AND TAZOBACTAM SODIUM 3.38 G: 3; .375 INJECTION, POWDER, LYOPHILIZED, FOR SOLUTION INTRAVENOUS at 10:07

## 2024-07-19 RX ADMIN — ACETAMINOPHEN 650 MG: 325 TABLET ORAL at 07:07

## 2024-07-19 RX ADMIN — IOHEXOL 100 ML: 350 INJECTION, SOLUTION INTRAVENOUS at 12:07

## 2024-07-19 RX ADMIN — FAMOTIDINE 20 MG: 20 TABLET ORAL at 09:07

## 2024-07-19 RX ADMIN — ONDANSETRON 4 MG: 2 INJECTION INTRAMUSCULAR; INTRAVENOUS at 02:07

## 2024-07-19 RX ADMIN — LORAZEPAM 0.5 MG: 2 INJECTION INTRAMUSCULAR; INTRAVENOUS at 04:07

## 2024-07-19 RX ADMIN — ONDANSETRON 4 MG: 2 INJECTION INTRAMUSCULAR; INTRAVENOUS at 08:07

## 2024-07-19 RX ADMIN — SODIUM CHLORIDE 1000 ML: 0.9 INJECTION, SOLUTION INTRAVENOUS at 11:07

## 2024-07-19 RX ADMIN — SODIUM CHLORIDE, POTASSIUM CHLORIDE, SODIUM LACTATE AND CALCIUM CHLORIDE: 600; 310; 30; 20 INJECTION, SOLUTION INTRAVENOUS at 09:07

## 2024-07-19 RX ADMIN — MORPHINE SULFATE 1 MG: 2 INJECTION, SOLUTION INTRAMUSCULAR; INTRAVENOUS at 08:07

## 2024-07-19 NOTE — ASSESSMENT & PLAN NOTE
Acute, likely related to pain and anxiety.   Give ativan x1 dose  Prn IV hydralazine.   Monitor.

## 2024-07-19 NOTE — ED PROVIDER NOTES
Encounter Date: 7/19/2024       History     Chief Complaint   Patient presents with    Abdominal Pain     SUDDEN ONSET THIS AM 1 AM    Diarrhea     58-year-old female presents complaining of abdominal pain that started about 1:00 a.m..  Reports crampy diffuse abdominal pain and multiple episodes of loose watery stool.  The stool later started having evidence of maroon-colored blood.  Denies vomiting.  Has had some body aches but does not think she has had fever.  Denies any recent illnesses.  The patient's daughter has had recent similar symptoms.  Denies chest pain coughing sore throat or shortness of breath.  Denies any urinary problems or changes.  Denies any other problems or complaints.        Review of patient's allergies indicates:  No Known Allergies  Past Medical History:   Diagnosis Date    Diverticulitis      Past Surgical History:   Procedure Laterality Date    hemorroid surgery      HYSTERECTOMY      SHOULDER SURGERY       No family history on file.  Social History     Tobacco Use    Smoking status: Every Day     Current packs/day: 1.00     Average packs/day: 1 pack/day for 35.0 years (35.0 ttl pk-yrs)     Types: Cigarettes   Substance Use Topics    Alcohol use: No    Drug use: No     Review of Systems   Constitutional:  Positive for activity change, appetite change, chills and fatigue. Negative for fever.   HENT: Negative.  Negative for congestion, ear pain, rhinorrhea, sore throat and trouble swallowing.    Eyes: Negative.  Negative for photophobia, pain, redness and visual disturbance.   Respiratory: Negative.  Negative for cough, chest tightness, shortness of breath and wheezing.    Cardiovascular: Negative.  Negative for chest pain, palpitations and leg swelling.   Gastrointestinal:  Positive for abdominal pain and diarrhea. Negative for abdominal distention, blood in stool, constipation, nausea and vomiting.   Endocrine: Negative.    Genitourinary: Negative.  Negative for decreased urine volume,  difficulty urinating, dysuria, flank pain, frequency, pelvic pain and urgency.   Musculoskeletal: Negative.  Negative for arthralgias, back pain, gait problem, myalgias, neck pain and neck stiffness.   Skin: Negative.  Negative for rash.   Neurological: Negative.  Negative for dizziness, syncope, facial asymmetry, speech difficulty, weakness, light-headedness, numbness and headaches.   Hematological:  Does not bruise/bleed easily.   Psychiatric/Behavioral: Negative.  Negative for confusion.    All other systems reviewed and are negative.      Physical Exam     Initial Vitals [07/19/24 1014]   BP Pulse Resp Temp SpO2   (!) 193/103 61 18 98.6 °F (37 °C) 98 %      MAP       --         Physical Exam    Nursing note and vitals reviewed.  Constitutional: She is not diaphoretic. She is active and cooperative.  Non-toxic appearance. She does not have a sickly appearance. She does not appear ill. No distress.   HENT:   Head: Normocephalic and atraumatic.   Right Ear: Tympanic membrane normal.   Left Ear: Tympanic membrane normal.   Nose: Nose normal.   Mouth/Throat: Uvula is midline, oropharynx is clear and moist and mucous membranes are normal. No oral lesions. No uvula swelling. No oropharyngeal exudate, posterior oropharyngeal edema or posterior oropharyngeal erythema.   Eyes: Conjunctivae, EOM and lids are normal. Pupils are equal, round, and reactive to light. No scleral icterus.   Neck: Trachea normal and phonation normal. Neck supple. No thyroid mass and no thyromegaly present. No stridor present. No JVD present.   Normal range of motion.   Full passive range of motion without pain.     Cardiovascular:  Normal rate, regular rhythm, normal heart sounds, intact distal pulses and normal pulses.     Exam reveals no gallop, no distant heart sounds, no friction rub and no decreased pulses.       No murmur heard.  Pulmonary/Chest: Effort normal and breath sounds normal. No accessory muscle usage or stridor. No tachypnea. No  respiratory distress. She has no wheezes. She has no rhonchi. She has no rales.   Abdominal: Abdomen is soft. Bowel sounds are normal. She exhibits no distension, no pulsatile midline mass and no mass. There is generalized abdominal tenderness. There is no rigidity and no guarding.   Musculoskeletal:         General: No tenderness or edema. Normal range of motion.      Right hand: Normal. Normal range of motion. Normal capillary refill. Normal pulse.      Left hand: Normal. Normal range of motion. Normal capillary refill. Normal pulse.      Cervical back: Normal, full passive range of motion without pain, normal range of motion and neck supple. No edema, erythema, rigidity or bony tenderness. No pain with movement, spinous process tenderness or muscular tenderness. Normal range of motion.      Thoracic back: Normal. No bony tenderness. Normal range of motion.      Lumbar back: Normal. No bony tenderness. Normal range of motion.      Right foot: Normal. Normal range of motion and normal capillary refill. No tenderness or bony tenderness. Normal pulse.      Left foot: Normal. Normal range of motion and normal capillary refill. No tenderness or bony tenderness. Normal pulse.      Comments: Pulses are 2+ throughout, cap refill is less than 2 sec throughout, extremities are nontender throughout with full range of motion. There is no spinal tenderness to palpation.     Neurological: She is alert and oriented to person, place, and time. She has normal strength. She displays normal reflexes. No cranial nerve deficit or sensory deficit. Gait normal.   No focal deficits.   Skin: Skin is warm, dry and intact. Capillary refill takes less than 2 seconds. No ecchymosis, no petechiae and no rash noted. No erythema. No pallor.   Psychiatric: She has a normal mood and affect. Her speech is normal and behavior is normal. Judgment and thought content normal. Cognition and memory are normal.         ED Course   Procedures  Labs  Reviewed   COMPREHENSIVE METABOLIC PANEL - Abnormal; Notable for the following components:       Result Value    Glucose 129 (*)     All other components within normal limits   GROUP A STREP, MOLECULAR   CULTURE, STOOL   CLOSTRIDIUM DIFFICILE   LIPASE   TROPONIN I HIGH SENSITIVITY   B-TYPE NATRIURETIC PEPTIDE   SARS-COV-2 RNA AMPLIFICATION, QUAL   INFLUENZA A AND B ANTIGEN    Narrative:     Specimen Source->Nasopharyngeal Swab   MAGNESIUM   CK   TSH   B-TYPE NATRIURETIC PEPTIDE   CBC W/ AUTO DIFFERENTIAL   URINALYSIS, REFLEX TO URINE CULTURE   DRUG SCREEN PANEL, URINE EMERGENCY   TROPONIN I HIGH SENSITIVITY   TROPONIN I HIGH SENSITIVITY   TSH   CK   MAGNESIUM   OCCULT BLOOD X 1, STOOL   WBC, STOOL   STOOL EXAM-OVA,CYSTS,PARASITES   ROTAVIRUS ANTIGEN, STOOL          Imaging Results              X-Ray Chest AP Portable (In process)                      Medications   sodium chloride 0.9% bolus 1,000 mL 1,000 mL (has no administration in time range)   acetaminophen tablet 1,000 mg (1,000 mg Oral Given 7/19/24 1127)     Medical Decision Making  Amount and/or Complexity of Data Reviewed  Labs: ordered.  Radiology: ordered.    Risk  OTC drugs.  Prescription drug management.                                      Clinical Impression:  Final diagnoses:  [R10.10] Upper abdominal pain

## 2024-07-19 NOTE — Clinical Note
Diagnosis: Acute colitis [409127]   Future Attending Provider: SAVANNA NINA [[id number]]   Place in Observation: [facility] [[id number]]

## 2024-07-19 NOTE — FIRST PROVIDER EVALUATION
Emergency Department TeleTriage Encounter Note      CHIEF COMPLAINT    Chief Complaint   Patient presents with    Abdominal Pain     SUDDEN ONSET THIS AM 1 AM    Diarrhea       VITAL SIGNS   Initial Vitals [07/19/24 1014]   BP Pulse Resp Temp SpO2   (!) 193/103 61 18 98.6 °F (37 °C) 98 %      MAP       --            ALLERGIES    Review of patient's allergies indicates:  No Known Allergies    PROVIDER TRIAGE NOTE  This is a teletriage evaluation of a 58 y.o. female presenting to the ED with c/o upper left abdominal pain, constant. Diarrhea. No fevers. Limited physical exam via telehealth: The patient is awake, alert, answering questions appropriately and is not in respiratory distress. Initial orders will be placed and care will be transferred to an alternate provider when patient is roomed for a full evaluation. Any additional orders and the final disposition will be determined by that provider.         ORDERS  Labs Reviewed - No data to display    ED Orders (720h ago, onward)      Start Ordered     Status Ordering Provider    07/19/24 1027 07/19/24 1026  Vital signs  Every 2 hours         Ordered KIRAN SLOAN    07/19/24 1027 07/19/24 1026  Diet NPO  Diet effective now         Ordered KIRAN SLOAN    07/19/24 1027 07/19/24 1026  Insert peripheral IV  Once         Ordered KIRAN SLOAN    07/19/24 1027 07/19/24 1026  CBC W/ AUTO DIFFERENTIAL  STAT         Ordered KIRAN SLOAN    07/19/24 1027 07/19/24 1026  Comp. Metabolic Panel  STAT         Ordered KIRAN SLOAN    07/19/24 1027 07/19/24 1026  Lipase  STAT         Ordered KIRAN SLOAN    07/19/24 1027 07/19/24 1026  Urinalysis, Reflex to Urine Culture Urine, Clean Catch  STAT         Ordered KIRAN SLOAN    07/19/24 1027 07/19/24 1026  EKG 12-lead  Once         Ordered KIRAN SLOAN              Virtual Visit Note: The provider triage portion of this emergency department evaluation and documentation was performed via [employer], a  HIPAA-compliant telemedicine application, in concert with a tele-presenter in the room. A face to face patient evaluation with one of my colleagues will occur once the patient is placed in an emergency department room.      DISCLAIMER: This note was prepared with [employer] voice recognition transcription software. Garbled syntax, mangled pronouns, and other bizarre constructions may be attributed to that software system.

## 2024-07-19 NOTE — HPI
58 year old female with a past medical history of anxiety, COVID, depression, GERD, hyperlipidemia, tobacco and marijuana smoker whom presents to the emergency room for reports of abdominal pain. Reports left upper abdominal pain, constant. Started at 0100. Crampy diffuse with multiple episodes of loose watery stool which she reports had maroon colored blood.  Denies vomiting, chest pain, shortness of breath, dysuria.  Upon arrival to the emergency room, patient hypertensive at 193/103.  WBC 15.24, hemoconcentrated with a hemoglobin 16.6 hematocrit 50 glucose 129, TSH 2.24, negative troponins CPK and BNP.  Group a strep negative flu and COVID negative C diff negative occult blood positive rotavirus negative.  Chest x-ray negative CT abdomen with severe colitis.  IV Zosyn given in ER, admit to hospital medicine.  Trend H and H monitor for overt bleeding continue IV antibiotics IV fluids pain control antiemetics

## 2024-07-19 NOTE — H&P
[facility] - Emergency Dept  Hospital Medicine  History & Physical    Patient Name: Kala Wyatt  MRN: [id number]  Patient Class: OP- Observation  Admission Date: 7/19/2024  Attending Physician: Jose Luis Doyle MD   Primary Care Provider: Basilio Isaac MD         Patient information was obtained from patient and ER records.     Subjective:     Principal Problem:Colitis    Chief Complaint:   Chief Complaint   Patient presents with    Abdominal Pain     SUDDEN ONSET THIS AM 1 AM    Diarrhea        HPI: 58 year old female with a past medical history of anxiety, COVID, depression, GERD, hyperlipidemia, tobacco and marijuana smoker whom presents to the emergency room for reports of abdominal pain. Reports left upper abdominal pain, constant. Started at 0100. Crampy diffuse with multiple episodes of loose watery stool which she reports had maroon colored blood.  Denies vomiting, chest pain, shortness of breath, dysuria.  Upon arrival to the emergency room, patient hypertensive at 193/103.  WBC 15.24, hemoconcentrated with a hemoglobin 16.6 hematocrit 50 glucose 129, TSH 2.24, negative troponins CPK and BNP.  Group a strep negative flu and COVID negative C diff negative occult blood positive rotavirus negative.  Chest x-ray negative CT abdomen with severe colitis.  IV Zosyn given in ER, admit to hospital medicine.  Trend H and H monitor for overt bleeding continue IV antibiotics IV fluids pain control antiemetics               Past Medical History:   Diagnosis Date    Diverticulitis        Past Surgical History:   Procedure Laterality Date    hemorroid surgery      HYSTERECTOMY      SHOULDER SURGERY         Review of patient's allergies indicates:  No Known Allergies    No current facility-administered medications on file prior to encounter.     Current Outpatient Medications on File Prior to Encounter   Medication Sig    butalbital-acetaminophen-caffeine -40 mg (FIORICET, ESGIC) -40 mg per  tablet Take 1 tablet by mouth every 4 (four) hours as needed for Pain.    docusate sodium (COLACE) 50 MG capsule Take by mouth 2 (two) times daily.     Family History    None       Tobacco Use    Smoking status: Every Day     Current packs/day: 1.00     Average packs/day: 1 pack/day for 35.0 years (35.0 ttl pk-yrs)     Types: Cigarettes    Smokeless tobacco: Not on file   Substance and Sexual Activity    Alcohol use: No    Drug use: No    Sexual activity: Not on file     Review of Systems   Constitutional:  Positive for activity change and fatigue.   Respiratory: Negative.     Cardiovascular: Negative.    Gastrointestinal:  Positive for abdominal distention, abdominal pain, blood in stool and diarrhea. Negative for nausea and vomiting.   Genitourinary: Negative.    Musculoskeletal: Negative.    Skin: Negative.    Neurological: Negative.    Psychiatric/Behavioral: Negative.       Objective:     Vital Signs (Most Recent):  Temp: 98.6 °F (37 °C) (07/19/24 1014)  Pulse: 61 (07/19/24 1014)  Resp: 18 (07/19/24 1436)  BP: (!) 193/103 (07/19/24 1014)  SpO2: 98 % (07/19/24 1014) Vital Signs (24h Range):  Temp:  [98.6 °F (37 °C)] 98.6 °F (37 °C)  Pulse:  [61] 61  Resp:  [18] 18  SpO2:  [98 %] 98 %  BP: (193)/(103) 193/103     Weight: 71.7 kg (158 lb)  Body mass index is 27.99 kg/m².     Physical Exam  Vitals reviewed.   Constitutional:       Appearance: Normal appearance.   HENT:      Head: Atraumatic.      Nose: Nose normal.      Mouth/Throat:      Mouth: Mucous membranes are moist.   Eyes:      Extraocular Movements: Extraocular movements intact.      Pupils: Pupils are equal, round, and reactive to light.   Cardiovascular:      Rate and Rhythm: Normal rate and regular rhythm.      Pulses: Normal pulses.      Heart sounds: Normal heart sounds. No murmur heard.  Pulmonary:      Effort: Pulmonary effort is normal. No respiratory distress.      Breath sounds: Normal breath sounds.   Abdominal:      General: Bowel sounds are  normal.      Tenderness: There is generalized abdominal tenderness.   Musculoskeletal:         General: Normal range of motion.      Cervical back: Neck supple.   Skin:     General: Skin is warm and dry.   Neurological:      General: No focal deficit present.      Mental Status: She is alert. Mental status is at baseline.   Psychiatric:         Mood and Affect: Mood normal.              CRANIAL NERVES     CN III, IV, VI   Pupils are equal, round, and reactive to light.       Significant Labs: All pertinent labs within the past 24 hours have been reviewed.  Recent Lab Results  (Last 5 results in the past 24 hours)        07/19/24  1418   07/19/24  1414   07/19/24  1127   07/19/24  1111   07/19/24  1049        Influenza A, Molecular         Negative       Influenza B, Molecular         Negative       Group A Strep, Molecular         Negative  Comment: Arcanobacterium haemolyticum and Beta Streptococcus group C   and G will not be detected by this test method.  Please order   Throat Culture ([id number]) if suspected.         BNP       46  Comment: Values of less than 100 pg/ml are consistent with non-CHF populations.         C difficile Toxins A+B, EIA     Negative  Comment: Testing not recommended for children <24 months old.           C. diff Antigen     Negative           CPK       48         Flu A & B Source         Nasal swab       Magnesium        1.8         Occult Blood     Positive           POC Lactate 0.86               Rotavirus     Negative           Sample VENOUS               SARS-CoV-2 RNA, Amplification, Qual         Negative  Comment: This test utilizes isothermal nucleic acid amplification technology   to   detect the SARS-CoV-2 RdRp nucleic acid segment. The analytical   sensitivity   (limit of detection) is 500 copies/swab.     A POSITIVE result is indicative of the presence of SARS-CoV-2 RNA;   clinical   correlation with patient history and other diagnostic information is   necessary to determine  patient infection status.    A NEGATIVE result means that SARS-CoV-2 nucleic acids are not present   above   the limit of detection. A NEGATIVE result should be treated as   presumptive.   It does not rule out the possibility of COVID-19 and should not be   the sole   basis for treatment decisions. If COVID-19 is strongly suspected   based on   clinical and exposure history, re-testing using an alternate   molecular assay   should be considered.     This test is FDA approved.Performance characteristics of this test   has been   independently verified by [facility] in conjunction   with   [facility] Department of Pathology and Laboratory   Medicine.         Troponin I High Sensitivity   3.9  Comment: Troponin results differ between methods. Do not use   results between Troponin methods interchangeably.    Alkaline Phospatase levels above 400 U/L may   cause false positive results.    Access hsTnI should not be used for patients taking   Asfotase sam (Strensiq).       5.2  Comment: Troponin results differ between methods. Do not use   results between Troponin methods interchangeably.    Alkaline Phospatase levels above 400 U/L may   cause false positive results.    Access hsTnI should not be used for patients taking   Asfotase sam (Strensiq).           TSH       2.247         Stool WBC     No neutrophils seen                                  Significant Imaging: I have reviewed all pertinent imaging results/findings within the past 24 hours.  Assessment/Plan:     * Colitis  WBC 15,  CT abdomen with colitis  Zosyn IV given  Serial abdominal exams  IV hydration and pain control  Monitor for overt bleeding  Consider consulting GI if no improvement      Essential hypertension  Acute, likely related to pain and anxiety.   Give ativan x1 dose  Prn IV hydralazine.   Monitor.         VTE Risk Mitigation (From admission, onward)      None                 On 07/19/2024, patient should be placed in  hospital observation services under my care in collaboration with Dr. Doyle.           Pilar Karimi, NP  Department of Hospital Medicine  [facility] - Emergency Dept

## 2024-07-19 NOTE — ASSESSMENT & PLAN NOTE
WBC 15,  CT abdomen with colitis  Zosyn IV given  Serial abdominal exams  IV hydration and pain control  Monitor for overt bleeding  Consider consulting GI if no improvement

## 2024-07-19 NOTE — SUBJECTIVE & OBJECTIVE
Past Medical History:   Diagnosis Date    Diverticulitis        Past Surgical History:   Procedure Laterality Date    hemorroid surgery      HYSTERECTOMY      SHOULDER SURGERY         Review of patient's allergies indicates:  No Known Allergies    No current facility-administered medications on file prior to encounter.     Current Outpatient Medications on File Prior to Encounter   Medication Sig    butalbital-acetaminophen-caffeine -40 mg (FIORICET, ESGIC) -40 mg per tablet Take 1 tablet by mouth every 4 (four) hours as needed for Pain.    docusate sodium (COLACE) 50 MG capsule Take by mouth 2 (two) times daily.     Family History    None       Tobacco Use    Smoking status: Every Day     Current packs/day: 1.00     Average packs/day: 1 pack/day for 35.0 years (35.0 ttl pk-yrs)     Types: Cigarettes    Smokeless tobacco: Not on file   Substance and Sexual Activity    Alcohol use: No    Drug use: No    Sexual activity: Not on file     Review of Systems   Constitutional:  Positive for activity change and fatigue.   Respiratory: Negative.     Cardiovascular: Negative.    Gastrointestinal:  Positive for abdominal distention, abdominal pain, blood in stool and diarrhea. Negative for nausea and vomiting.   Genitourinary: Negative.    Musculoskeletal: Negative.    Skin: Negative.    Neurological: Negative.    Psychiatric/Behavioral: Negative.       Objective:     Vital Signs (Most Recent):  Temp: 98.6 °F (37 °C) (07/19/24 1014)  Pulse: 61 (07/19/24 1014)  Resp: 18 (07/19/24 1436)  BP: (!) 193/103 (07/19/24 1014)  SpO2: 98 % (07/19/24 1014) Vital Signs (24h Range):  Temp:  [98.6 °F (37 °C)] 98.6 °F (37 °C)  Pulse:  [61] 61  Resp:  [18] 18  SpO2:  [98 %] 98 %  BP: (193)/(103) 193/103     Weight: 71.7 kg (158 lb)  Body mass index is 27.99 kg/m².     Physical Exam  Vitals reviewed.   Constitutional:       Appearance: Normal appearance.   HENT:      Head: Atraumatic.      Nose: Nose normal.      Mouth/Throat:       Mouth: Mucous membranes are moist.   Eyes:      Extraocular Movements: Extraocular movements intact.      Pupils: Pupils are equal, round, and reactive to light.   Cardiovascular:      Rate and Rhythm: Normal rate and regular rhythm.      Pulses: Normal pulses.      Heart sounds: Normal heart sounds. No murmur heard.  Pulmonary:      Effort: Pulmonary effort is normal. No respiratory distress.      Breath sounds: Normal breath sounds.   Abdominal:      General: Bowel sounds are normal.      Tenderness: There is generalized abdominal tenderness.   Musculoskeletal:         General: Normal range of motion.      Cervical back: Neck supple.   Skin:     General: Skin is warm and dry.   Neurological:      General: No focal deficit present.      Mental Status: She is alert. Mental status is at baseline.   Psychiatric:         Mood and Affect: Mood normal.              CRANIAL NERVES     CN III, IV, VI   Pupils are equal, round, and reactive to light.       Significant Labs: All pertinent labs within the past 24 hours have been reviewed.  Recent Lab Results  (Last 5 results in the past 24 hours)        07/19/24  1418   07/19/24  1414   07/19/24  1127   07/19/24  1111   07/19/24  1049        Influenza A, Molecular         Negative       Influenza B, Molecular         Negative       Group A Strep, Molecular         Negative  Comment: Arcanobacterium haemolyticum and Beta Streptococcus group C   and G will not be detected by this test method.  Please order   Throat Culture ([id number]) if suspected.         BNP       46  Comment: Values of less than 100 pg/ml are consistent with non-CHF populations.         C difficile Toxins A+B, EIA     Negative  Comment: Testing not recommended for children <24 months old.           C. diff Antigen     Negative           CPK       48         Flu A & B Source         Nasal swab       Magnesium        1.8         Occult Blood     Positive           POC Lactate 0.86               Rotavirus      Negative           Sample VENOUS               SARS-CoV-2 RNA, Amplification, Qual         Negative  Comment: This test utilizes isothermal nucleic acid amplification technology   to   detect the SARS-CoV-2 RdRp nucleic acid segment. The analytical   sensitivity   (limit of detection) is 500 copies/swab.     A POSITIVE result is indicative of the presence of SARS-CoV-2 RNA;   clinical   correlation with patient history and other diagnostic information is   necessary to determine patient infection status.    A NEGATIVE result means that SARS-CoV-2 nucleic acids are not present   above   the limit of detection. A NEGATIVE result should be treated as   presumptive.   It does not rule out the possibility of COVID-19 and should not be   the sole   basis for treatment decisions. If COVID-19 is strongly suspected   based on   clinical and exposure history, re-testing using an alternate   molecular assay   should be considered.     This test is FDA approved.Performance characteristics of this test   has been   independently verified by [facility] in conjunction   with   [facility] Department of Pathology and Laboratory   Medicine.         Troponin I High Sensitivity   3.9  Comment: Troponin results differ between methods. Do not use   results between Troponin methods interchangeably.    Alkaline Phospatase levels above 400 U/L may   cause false positive results.    Access hsTnI should not be used for patients taking   Asfotase sam (Strensiq).       5.2  Comment: Troponin results differ between methods. Do not use   results between Troponin methods interchangeably.    Alkaline Phospatase levels above 400 U/L may   cause false positive results.    Access hsTnI should not be used for patients taking   Asfotase sam (Strensiq).           TSH       2.247         Stool WBC     No neutrophils seen                                  Significant Imaging: I have reviewed all pertinent imaging  results/findings within the past 24 hours.

## 2024-07-20 PROBLEM — K92.1 BLOOD IN STOOL: Status: ACTIVE | Noted: 2024-07-20

## 2024-07-20 LAB
ALBUMIN SERPL BCP-MCNC: 4 G/DL (ref 3.5–5.2)
ALP SERPL-CCNC: 75 U/L (ref 55–135)
ALT SERPL W/O P-5'-P-CCNC: 16 U/L (ref 10–44)
AMPHET+METHAMPHET UR QL: NEGATIVE
ANION GAP SERPL CALC-SCNC: 9 MMOL/L (ref 8–16)
AST SERPL-CCNC: 13 U/L (ref 10–40)
BARBITURATES UR QL SCN>200 NG/ML: NEGATIVE
BENZODIAZ UR QL SCN>200 NG/ML: ABNORMAL
BILIRUB SERPL-MCNC: 0.7 MG/DL (ref 0.1–1)
BILIRUB UR QL STRIP: NEGATIVE
BUN SERPL-MCNC: 7 MG/DL (ref 6–20)
BZE UR QL SCN: NEGATIVE
CALCIUM SERPL-MCNC: 8.8 MG/DL (ref 8.7–10.5)
CANNABINOIDS UR QL SCN: ABNORMAL
CHLORIDE SERPL-SCNC: 107 MMOL/L (ref 95–110)
CLARITY UR: CLEAR
CO2 SERPL-SCNC: 26 MMOL/L (ref 23–29)
COLOR UR: COLORLESS
CREAT SERPL-MCNC: 0.8 MG/DL (ref 0.5–1.4)
CREAT UR-MCNC: 45.5 MG/DL (ref 15–325)
ERYTHROCYTE [DISTWIDTH] IN BLOOD BY AUTOMATED COUNT: 14.4 % (ref 11.5–14.5)
EST. GFR  (NO RACE VARIABLE): >60 ML/MIN/1.73 M^2
GLUCOSE SERPL-MCNC: 114 MG/DL (ref 70–110)
GLUCOSE UR QL STRIP: NEGATIVE
HCT VFR BLD AUTO: 42 % (ref 37–48.5)
HGB BLD-MCNC: 13.7 G/DL (ref 12–16)
HGB UR QL STRIP: NEGATIVE
KETONES UR QL STRIP: NEGATIVE
LEUKOCYTE ESTERASE UR QL STRIP: NEGATIVE
MCH RBC QN AUTO: 29.8 PG (ref 27–31)
MCHC RBC AUTO-ENTMCNC: 32.6 G/DL (ref 32–36)
MCV RBC AUTO: 92 FL (ref 82–98)
NITRITE UR QL STRIP: NEGATIVE
OPIATES UR QL SCN: ABNORMAL
PCP UR QL SCN>25 NG/ML: NEGATIVE
PH UR STRIP: 6 [PH] (ref 5–8)
PLATELET # BLD AUTO: 313 K/UL (ref 150–450)
PMV BLD AUTO: 9.7 FL (ref 9.2–12.9)
POTASSIUM SERPL-SCNC: 3.5 MMOL/L (ref 3.5–5.1)
PROT SERPL-MCNC: 6.6 G/DL (ref 6–8.4)
PROT UR QL STRIP: NEGATIVE
RBC # BLD AUTO: 4.59 M/UL (ref 4–5.4)
SODIUM SERPL-SCNC: 142 MMOL/L (ref 136–145)
SP GR UR STRIP: 1.01 (ref 1–1.03)
TOXICOLOGY INFORMATION: ABNORMAL
URN SPEC COLLECT METH UR: ABNORMAL
UROBILINOGEN UR STRIP-ACNC: NEGATIVE EU/DL
WBC # BLD AUTO: 9.34 K/UL (ref 3.9–12.7)

## 2024-07-20 PROCEDURE — 25000003 PHARM REV CODE 250: Performed by: INTERNAL MEDICINE

## 2024-07-20 PROCEDURE — 80053 COMPREHEN METABOLIC PANEL: CPT | Performed by: INTERNAL MEDICINE

## 2024-07-20 PROCEDURE — 63600175 PHARM REV CODE 636 W HCPCS: Performed by: INTERNAL MEDICINE

## 2024-07-20 PROCEDURE — G0378 HOSPITAL OBSERVATION PER HR: HCPCS

## 2024-07-20 PROCEDURE — 36415 COLL VENOUS BLD VENIPUNCTURE: CPT | Performed by: INTERNAL MEDICINE

## 2024-07-20 PROCEDURE — 12000002 HC ACUTE/MED SURGE SEMI-PRIVATE ROOM

## 2024-07-20 PROCEDURE — 63600175 PHARM REV CODE 636 W HCPCS: Performed by: NURSE PRACTITIONER

## 2024-07-20 PROCEDURE — 25000003 PHARM REV CODE 250: Performed by: NURSE PRACTITIONER

## 2024-07-20 PROCEDURE — 85027 COMPLETE CBC AUTOMATED: CPT | Performed by: INTERNAL MEDICINE

## 2024-07-20 RX ORDER — PANTOPRAZOLE SODIUM 40 MG/10ML
40 INJECTION, POWDER, LYOPHILIZED, FOR SOLUTION INTRAVENOUS DAILY
Status: DISCONTINUED | OUTPATIENT
Start: 2024-07-20 | End: 2024-07-22 | Stop reason: HOSPADM

## 2024-07-20 RX ORDER — ALUMINUM HYDROXIDE, MAGNESIUM HYDROXIDE, AND SIMETHICONE 1200; 120; 1200 MG/30ML; MG/30ML; MG/30ML
30 SUSPENSION ORAL ONCE
Status: COMPLETED | OUTPATIENT
Start: 2024-07-20 | End: 2024-07-20

## 2024-07-20 RX ORDER — TALC
6 POWDER (GRAM) TOPICAL NIGHTLY PRN
Status: DISCONTINUED | OUTPATIENT
Start: 2024-07-21 | End: 2024-07-22 | Stop reason: HOSPADM

## 2024-07-20 RX ORDER — ALPRAZOLAM 0.25 MG/1
0.25 TABLET ORAL NIGHTLY PRN
Status: COMPLETED | OUTPATIENT
Start: 2024-07-21 | End: 2024-07-21

## 2024-07-20 RX ORDER — HYDRALAZINE HYDROCHLORIDE 20 MG/ML
10 INJECTION INTRAMUSCULAR; INTRAVENOUS EVERY 6 HOURS PRN
Status: DISCONTINUED | OUTPATIENT
Start: 2024-07-20 | End: 2024-07-22 | Stop reason: HOSPADM

## 2024-07-20 RX ORDER — SODIUM CHLORIDE 9 MG/ML
INJECTION, SOLUTION INTRAVENOUS CONTINUOUS
Status: DISCONTINUED | OUTPATIENT
Start: 2024-07-20 | End: 2024-07-22 | Stop reason: HOSPADM

## 2024-07-20 RX ORDER — LIDOCAINE HYDROCHLORIDE 20 MG/ML
15 SOLUTION OROPHARYNGEAL ONCE
Status: COMPLETED | OUTPATIENT
Start: 2024-07-20 | End: 2024-07-20

## 2024-07-20 RX ADMIN — MORPHINE SULFATE 1 MG: 2 INJECTION, SOLUTION INTRAMUSCULAR; INTRAVENOUS at 04:07

## 2024-07-20 RX ADMIN — PIPERACILLIN SODIUM AND TAZOBACTAM SODIUM 3.38 G: 3; .375 INJECTION, POWDER, LYOPHILIZED, FOR SOLUTION INTRAVENOUS at 02:07

## 2024-07-20 RX ADMIN — PIPERACILLIN SODIUM AND TAZOBACTAM SODIUM 3.38 G: 3; .375 INJECTION, POWDER, LYOPHILIZED, FOR SOLUTION INTRAVENOUS at 06:07

## 2024-07-20 RX ADMIN — Medication 6 MG: at 11:07

## 2024-07-20 RX ADMIN — ALUMINUM HYDROXIDE, MAGNESIUM HYDROXIDE, AND SIMETHICONE 30 ML: 1200; 120; 1200 SUSPENSION ORAL at 06:07

## 2024-07-20 RX ADMIN — PANTOPRAZOLE SODIUM 40 MG: 40 INJECTION, POWDER, FOR SOLUTION INTRAVENOUS at 11:07

## 2024-07-20 RX ADMIN — ONDANSETRON 4 MG: 2 INJECTION INTRAMUSCULAR; INTRAVENOUS at 04:07

## 2024-07-20 RX ADMIN — ACETAMINOPHEN 650 MG: 325 TABLET ORAL at 05:07

## 2024-07-20 RX ADMIN — ONDANSETRON 4 MG: 2 INJECTION INTRAMUSCULAR; INTRAVENOUS at 08:07

## 2024-07-20 RX ADMIN — MORPHINE SULFATE 1 MG: 2 INJECTION, SOLUTION INTRAMUSCULAR; INTRAVENOUS at 11:07

## 2024-07-20 RX ADMIN — PIPERACILLIN SODIUM AND TAZOBACTAM SODIUM 3.38 G: 3; .375 INJECTION, POWDER, LYOPHILIZED, FOR SOLUTION INTRAVENOUS at 10:07

## 2024-07-20 RX ADMIN — SODIUM CHLORIDE: 9 INJECTION, SOLUTION INTRAVENOUS at 11:07

## 2024-07-20 RX ADMIN — OXYCODONE HYDROCHLORIDE 5 MG: 5 TABLET ORAL at 04:07

## 2024-07-20 RX ADMIN — ONDANSETRON 4 MG: 2 INJECTION INTRAMUSCULAR; INTRAVENOUS at 11:07

## 2024-07-20 RX ADMIN — OXYCODONE HYDROCHLORIDE 5 MG: 5 TABLET ORAL at 08:07

## 2024-07-20 RX ADMIN — LIDOCAINE HYDROCHLORIDE 15 ML: 20 SOLUTION ORAL at 06:07

## 2024-07-20 RX ADMIN — FAMOTIDINE 20 MG: 20 TABLET ORAL at 08:07

## 2024-07-20 RX ADMIN — ACETAMINOPHEN 650 MG: 325 TABLET ORAL at 08:07

## 2024-07-20 NOTE — NURSING
Nurses Note -- 4 Eyes      7/20/2024   12:21 AM      Skin assessed during: Admit      [] No Altered Skin Integrity Present    []Prevention Measures Documented      [x] Yes- Altered Skin Integrity Present or Discovered   [x] LDA Added if Not in Epic (Describe Wound)   [x] New Altered Skin Integrity was Present on Admit and Documented in LDA   [x] Wound Image Taken    Wound Care Consulted? No    Attending Nurse:  Bucky Llanes RN/Staff Member:   [id number]

## 2024-07-20 NOTE — ASSESSMENT & PLAN NOTE
-likely due to colitis   -FOBT on 07/19/2024 = positive   -monitor H&H = stable range but with some decreased (likely due to dilutional effect of IV fluids)  -PPI daily   -consider transfusion 1 unit PRBC if and when hemoglobin less than 7  -GI consulted = follow recommendations

## 2024-07-20 NOTE — PLAN OF CARE
[facility]  Initial Discharge Assessment       Primary Care Provider: Basilio Isaac MD    Admission Diagnosis: Acute colitis [K52.9]    Admission Date: 7/19/2024  Expected Discharge Date:   Pt is a 58-year-old female who arrived from home with Colitis problem. Information verified as correct on facesheet. The assessment was completed at the patient's bedside.  Pt lives alone but receives assistance from friend who lives next door. Pt does not have a Living Will or Advance Directive. The Pt is oriented to person, places, and times. PCP last seen.  Pt denies Coumadin, dialysis, DME, HH, and has not been readmitted into the hospital in the last thirty days.  Pt is capable of performing ADLs without assistance. Pt drives to and from medical appointments. Pt reports she takes medication as prescribed; pharmacy [facility] in [place], [place].  Pt verbalized plan to discharge home via friend transport. Pt has no other needs to be addressed at this time. CM reviewed the chart and will continue to monitor.    Transition of Care Barriers: (P) None    Payor: MEDICAID / Plan: [facility] / Product Type: Managed Medicaid /     Extended Emergency Contact Information  Primary Emergency Contact: Charles Sanchez  Mobile Phone: [phone]  Relation: Friend  Preferred language: English   needed? No  Secondary Emergency Contact: JimmySilvia  Mobile Phone: [phone]  Relation: Daughter  Preferred language: English   needed? No    Discharge Plan A: (P) Home  Discharge Plan B: (P) Home      [facility] [place] - [address]  [address]  [place] [address]  Phone: [phone] Fax: [phone]      Initial Assessment (most recent)       Adult Discharge Assessment - 07/20/24 1020          Discharge Assessment    Assessment Type Discharge Planning Assessment (P)      Confirmed/corrected address, phone number and insurance Yes (P)       Confirmed Demographics Correct on Facesheet (P)      Source of Information patient (P)      When was your last doctors appointment? -- (P)    8 mos ago    Does patient/caregiver understand observation status Yes (P)      Communicated ADELAIDE with patient/caregiver Yes (P)      Reason For Admission Colitis (P)      People in Home alone (P)      Do you expect to return to your current living situation? Yes (P)      Do you have help at home or someone to help you manage your care at home? Yes (P)      Who are your caregiver(s) and their phone number(s)? Charles Sanchez - Friend (P)      Prior to hospitilization cognitive status: Alert/Oriented (P)      Current cognitive status: Alert/Oriented (P)      Walking or Climbing Stairs Difficulty no (P)      Dressing/Bathing Difficulty no (P)      Home Accessibility -- (P)    Pt states she has two stairs to enter her home but no difficulty climbing the stairs    Home Layout Able to live on 1st floor (P)      Equipment Currently Used at Home none (P)      Readmission within 30 days? No (P)      Patient currently being followed by outpatient case management? No (P)      Do you currently have service(s) that help you manage your care at home? No (P)      Do you take prescription medications? Yes (P)      Do you have prescription coverage? Yes (P)      Coverage MEDICAID - [facility] - (P)      Do you have any problems affording any of your prescribed medications? No (P)      Is the patient taking medications as prescribed? yes (P)      Who is going to help you get home at discharge? Charles Sanchez - Friend (P)      How do you get to doctors appointments? family or friend will provide (P)      Are you on dialysis? No (P)      Do you take coumadin? No (P)      Discharge Plan A Home (P)      Discharge Plan B Home (P)      DME Needed Upon Discharge  none (P)      Discharge Plan discussed with: Patient (P)      Transition of Care Barriers None (P)

## 2024-07-20 NOTE — PROGRESS NOTES
On license of [facility]  Progress Note    Patient Name: Kala Wyatt  MRN: [id number]  Patient Class: OP- Observation   Admission Date: 7/19/2024  Length of Stay: 0 days  Attending Physician: Darrius Garcia MD  Primary Care Provider: Basilio Isaac MD        Subjective:     Principal Problem:Colitis        HPI:  58 year old female with a past medical history of anxiety, COVID, depression, GERD, hyperlipidemia, tobacco and marijuana smoker whom presents to the emergency room for reports of abdominal pain. Reports left upper abdominal pain, constant. Started at 0100. Crampy diffuse with multiple episodes of loose watery stool which she reports had maroon colored blood.  Denies vomiting, chest pain, shortness of breath, dysuria.  Upon arrival to the emergency room, patient hypertensive at 193/103.  WBC 15.24, hemoconcentrated with a hemoglobin 16.6 hematocrit 50 glucose 129, TSH 2.24, negative troponins CPK and BNP.  Group a strep negative flu and COVID negative C diff negative occult blood positive rotavirus negative.  Chest x-ray negative CT abdomen with severe colitis.  IV Zosyn given in ER, admit to hospital medicine.  Trend H and H monitor for overt bleeding continue IV antibiotics IV fluids pain control antiemetics               Overview/Hospital Course:  No notes on file    Interval History:  Patient awake alert and in no acute distress.  Remains afebrile.  Denies chest pain shortness on breath.  Denies nausea vomiting.  Abdominal pain with improvement since admission.  States it was generalized yesterday but today localized to epigastric and right upper quadrant regions.  She appears comfortable and states she is hungry.  Plan of care discussed with patient.  She follows all commands otherwise.    Review of Systems   Constitutional:  Negative for activity change, fatigue and fever.   Respiratory:  Negative for cough and shortness of breath.    Cardiovascular:  Negative for chest  pain and leg swelling.   Gastrointestinal:  Positive for abdominal pain. Negative for nausea and vomiting.   All other systems reviewed and are negative.    Objective:     Vital Signs (Most Recent):  Temp: 97.6 °F (36.4 °C) (07/20/24 0755)  Pulse: 60 (07/20/24 0755)  Resp: 20 (07/20/24 0803)  BP: (!) 149/66 (07/20/24 0755)  SpO2: 95 % (RA) (07/20/24 0755) Vital Signs (24h Range):  Temp:  [97.6 °F (36.4 °C)-98.6 °F (37 °C)] 97.6 °F (36.4 °C)  Pulse:  [] 60  Resp:  [17-20] 20  SpO2:  [92 %-98 %] 95 %  BP: (129-193)/() 149/66     Weight: 69.2 kg (152 lb 9.6 oz)  Body mass index is 27.03 kg/m².    Intake/Output Summary (Last 24 hours) at 7/20/2024 0853  Last data filed at 7/20/2024 0815  Gross per 24 hour   Intake 821.67 ml   Output 600 ml   Net 221.67 ml         Physical Exam  Constitutional:       General: She is not in acute distress.     Appearance: Normal appearance.   HENT:      Head: Normocephalic and atraumatic.      Nose: Nose normal.      Mouth/Throat:      Mouth: Mucous membranes are moist.   Cardiovascular:      Rate and Rhythm: Normal rate and regular rhythm.      Pulses: Normal pulses.      Heart sounds: Normal heart sounds.   Pulmonary:      Effort: Pulmonary effort is normal. No respiratory distress.      Breath sounds: Normal breath sounds.   Abdominal:      General: Bowel sounds are normal. There is no distension.      Palpations: There is no mass.      Tenderness: There is no guarding.      Comments: Epigastric and right upper quadrant tenderness= improving   Musculoskeletal:         General: No deformity.   Skin:     General: Skin is warm and dry.   Neurological:      General: No focal deficit present.      Mental Status: She is alert and oriented to person, place, and time.   Psychiatric:         Mood and Affect: Mood normal.             Significant Labs: All pertinent labs within the past 24 hours have been reviewed.    Significant Imaging: I have reviewed all pertinent imaging  results/findings within the past 24 hours.    Assessment/Plan:      * Colitis  -afebrile   -monitor leukocytosis = resolved   -CT abdomen and pelvis with contrast on 07/19/2024 =1. Diffuse colonic wall thickening with suboptimal evaluation of colon due to collapsed state.  Although this could be physiologic in nature, the appearance is suspicious for infectious or inflammatory colitis in the appropriate clinical setting.  Clinical and laboratory correlation is requested.2. Multiple hepatic steatosis.  -C diff stool on 07/19/2024 = negative  -stool WBC on 07/19/2004 = negative  -stool culture on 07/19/2024 = pending   -blood cultures x2 on 07/19/2024 = negative x1 day.  Await 48 hour results  -Zosyn IV empirically.  Ordered on 07/19/2024  -status post LR IV at 100 cc per hour   -normal saline IV at 75 cc an hour x1 L and then stop.  Ordered on 07/20/2024  -GI consulted = follow recommendations        Blood in stool  -likely due to colitis   -FOBT on 07/19/2024 = positive   -monitor H&H = stable range but with some decreased (likely due to dilutional effect of IV fluids)  -PPI daily   -consider transfusion 1 unit PRBC if and when hemoglobin less than 7  -GI consulted = follow recommendations      Essential hypertension  -monitor blood pressure = stable range   -covered with hydralazine 10 mg IV p.r.n.   -adjust BP medication as needed         VTE Risk Mitigation (From admission, onward)           Ordered     Place sequential compression device  Until discontinued         07/20/24 0858                    Discharge Planning   ADELAIDE:      Code Status: Full Code   Is the patient medically ready for discharge?:     Reason for patient still in hospital (select all that apply): Patient trending condition, Treatment, and Consult recommendations           Discharge planning to home pending GI clearance and if tolerating oral diet and pending blood culture results at 48 hours and if H&H remained stable..  Possible discharge 1-2  days          Darrius Garcia MD  Department of Hospital Medicine   On license of [facility]

## 2024-07-20 NOTE — NURSING
Patient is complaining of pain in her abdomen after her dinner. She is requesting a GI cocktail, states she's had in the past and this has worked. Notified Dr Post, new orders rec'd for GI cocktail, aluminum-magnesium hydroxide-simethicone 200-200-20 mg/5 mL suspension 30 mL, LIDOcaine viscous HCl 2% oral solution 15 mL x 1 dose.

## 2024-07-20 NOTE — ASSESSMENT & PLAN NOTE
-monitor blood pressure = stable range   -covered with hydralazine 10 mg IV p.r.n.   -adjust BP medication as needed

## 2024-07-20 NOTE — CONSULTS
GASTROENTEROLOGY INPATIENT CONSULT NOTE  Patient Name: aKla Wyatt  Patient MRN: [id number]  Patient : 1965    Admit Date: 2024  Service date: 2024    Reason for Consult:  Diarrhea and blood in the stool    PCP: Basilio Isaac MD    Chief Complaint   Patient presents with    Abdominal Pain     SUDDEN ONSET THIS AM 1 AM    Diarrhea       HPI: Patient is a 58 y.o. female with PMHx  who woke up at 1:00 a.m. in the morning with severe cramps and then diarrhea she had diarrhea all morning eventually started seeing some red blood.  Her H&H however is normal.  She thinks she may have food poisoning.  So far C difficile is negative rotavirus is negative.  She has been given IV Zosyn in his improved dramatically.  Her diarrhea stopped.  She is afebrile.  CT scan of the abdomen revealed.  Diffuse colitis consistent with infectious inflammation.  Colonoscopy done several years ago in [place] it was unremarkable.    Past Medical History:  Past Medical History:   Diagnosis Date    Diverticulitis         Past Surgical History:  Past Surgical History:   Procedure Laterality Date    hemorroid surgery      HYSTERECTOMY      SHOULDER SURGERY          Home Medications:  Medications Prior to Admission   Medication Sig Dispense Refill Last Dose    atorvastatin (LIPITOR) 80 MG tablet Take 80 mg by mouth once daily.   2024    diclofenac sodium (VOLTAREN) 1 % Gel Apply 1 Application topically 4 (four) times daily.       meloxicam (MOBIC) 15 MG tablet Take 15 mg by mouth daily as needed for Pain.   2024    methocarbamoL (ROBAXIN) 500 MG Tab Take 500 mg by mouth 3 (three) times daily as needed (Muscle Spasms).          Inpatient Medications:   pantoprazole  40 mg Intravenous Daily    piperacillin-tazobactam (Zosyn) IV (PEDS and ADULTS) (extended infusion is not appropriate)  3.375 g Intravenous Q8H       Current Facility-Administered Medications:     acetaminophen, 650 mg, Oral, Q4H PRN    hydrALAZINE, 10  "mg, Intravenous, Q6H PRN    magnesium oxide, 800 mg, Oral, PRN    magnesium oxide, 800 mg, Oral, PRN    morphine, 1 mg, Intravenous, Q6H PRN    ondansetron, 4 mg, Intravenous, Q6H PRN    oxyCODONE, 5 mg, Oral, Q6H PRN    potassium bicarbonate, 35 mEq, Oral, PRN    potassium bicarbonate, 50 mEq, Oral, PRN    potassium bicarbonate, 60 mEq, Oral, PRN    potassium, sodium phosphates, 2 packet, Oral, PRN    potassium, sodium phosphates, 2 packet, Oral, PRN    potassium, sodium phosphates, 2 packet, Oral, PRN    sodium chloride 0.9%, 10 mL, Intravenous, PRN    Review of patient's allergies indicates:  No Known Allergies    Social History:   Social History     Occupational History    Not on file   Tobacco Use    Smoking status: Every Day     Current packs/day: 1.00     Average packs/day: 1 pack/day for 35.0 years (35.0 ttl pk-yrs)     Types: Cigarettes    Smokeless tobacco: Not on file   Substance and Sexual Activity    Alcohol use: No    Drug use: No    Sexual activity: Not on file       Family History:   No family history on file.    Review of Systems:  A 10 point review of systems was performed and was normal, except as mentioned in the HPI, including constitutional, HEENT, heme, lymph, cardiovascular, respiratory, gastrointestinal, genitourinary, neurologic, endocrine, psychiatric and musculoskeletal.      OBJECTIVE:    Physical Exam:  24 Hour Vital Sign Ranges: Temp:  [97.6 °F (36.4 °C)-98.5 °F (36.9 °C)] 97.6 °F (36.4 °C)  Pulse:  [] 60  Resp:  [17-20] 18  SpO2:  [92 %-96 %] 96 %  BP: (129-180)/() 148/69  Most recent vitals: BP (!) 148/69   Pulse 60   Temp 97.6 °F (36.4 °C) (Oral)   Resp 18   Ht 5' 3" (1.6 m)   Wt 69.2 kg (152 lb 9.6 oz)   SpO2 96%   Breastfeeding No   BMI 27.03 kg/m²    GEN: well-developed, well-nourished, awake and alert, non-toxic appearing adult  HEENT: PERRL, sclera anicteric, oral mucosa pink and moist without lesion  NECK: trachea midline; Good ROM  CV: regular rate and " "rhythm, no murmurs or gallops  RESP: clear to auscultation bilaterally, no wheezes, rhonci or rales  ABD: soft, non-tender, non-distended, normal bowel sounds  EXT: no swelling or edema, 2+ pulses distally  SKIN: no rashes or jaundice  PSYCH: normal affect    Labs:   Recent Labs     07/19/24  1034 07/20/24  0550   WBC 15.24* 9.34   MCV 91 92    313     Recent Labs     07/19/24  1034 07/20/24  0551    142   K 4.0 3.5    107   CO2 26 26   BUN 10 7   * 114*     No results for input(s): "ALB" in the last 72 hours.    Invalid input(s): "ALKP", "SGOT", "SGPT", "TBIL", "DBIL", "TPRO"  No results for input(s): "PT", "INR", "PTT" in the last 72 hours.      Radiology Review:  X-Ray Chest AP Portable   Final Result      No acute cardiopulmonary abnormality.         Electronically signed by: Yamel Hampton   Date:    07/19/2024   Time:    12:55      CT Abdomen Pelvis With IV Contrast NO Oral Contrast   Final Result      1. Diffuse colonic wall thickening with suboptimal evaluation of colon due to collapsed state.  Although this could be physiologic in nature, the appearance is suspicious for infectious or inflammatory colitis in the appropriate clinical setting.  Clinical and laboratory correlation is requested.   2. Multiple hepatic steatosis.         Electronically signed by: Edis Morgan   Date:    07/19/2024   Time:    12:41            IMPRESSION / RECOMMENDATIONS:  Acute likely infectious colitis of the elevated white blood cell count diffuse inflammation of the colon.  She has had great response to Zosyn we will continue IV antibiotics slowly advance her diet if she tolerates she can go home on oral antibiotics for additional 7 days.  So far cultures have negative but this does not mean she does not have infectious diarrhea food poisoning.    Thank you for this consult.    Mata Post  7/20/2024  2:40 PM       "

## 2024-07-20 NOTE — ASSESSMENT & PLAN NOTE
-afebrile   -monitor leukocytosis = resolved   -CT abdomen and pelvis with contrast on 07/19/2024 =1. Diffuse colonic wall thickening with suboptimal evaluation of colon due to collapsed state.  Although this could be physiologic in nature, the appearance is suspicious for infectious or inflammatory colitis in the appropriate clinical setting.  Clinical and laboratory correlation is requested.2. Multiple hepatic steatosis.  -C diff stool on 07/19/2024 = negative  -stool WBC on 07/19/2004 = negative  -stool culture on 07/19/2024 = pending   -blood cultures x2 on 07/19/2024 = negative x1 day.  Await 48 hour results  -Zosyn IV empirically.  Ordered on 07/19/2024  -status post LR IV at 100 cc per hour   -normal saline IV at 75 cc an hour x1 L and then stop.  Ordered on 07/20/2024  -GI consulted = follow recommendations

## 2024-07-20 NOTE — SUBJECTIVE & OBJECTIVE
Interval History:  Patient awake alert and in no acute distress.  Remains afebrile.  Denies chest pain shortness on breath.  Denies nausea vomiting.  Abdominal pain with improvement since admission.  States it was generalized yesterday but today localized to epigastric and right upper quadrant regions.  She appears comfortable and states she is hungry.  Plan of care discussed with patient.  She follows all commands otherwise.    Review of Systems   Constitutional:  Negative for activity change, fatigue and fever.   Respiratory:  Negative for cough and shortness of breath.    Cardiovascular:  Negative for chest pain and leg swelling.   Gastrointestinal:  Positive for abdominal pain. Negative for nausea and vomiting.   All other systems reviewed and are negative.    Objective:     Vital Signs (Most Recent):  Temp: 97.6 °F (36.4 °C) (07/20/24 0755)  Pulse: 60 (07/20/24 0755)  Resp: 20 (07/20/24 0803)  BP: (!) 149/66 (07/20/24 0755)  SpO2: 95 % (RA) (07/20/24 0755) Vital Signs (24h Range):  Temp:  [97.6 °F (36.4 °C)-98.6 °F (37 °C)] 97.6 °F (36.4 °C)  Pulse:  [] 60  Resp:  [17-20] 20  SpO2:  [92 %-98 %] 95 %  BP: (129-193)/() 149/66     Weight: 69.2 kg (152 lb 9.6 oz)  Body mass index is 27.03 kg/m².    Intake/Output Summary (Last 24 hours) at 7/20/2024 0853  Last data filed at 7/20/2024 0815  Gross per 24 hour   Intake 821.67 ml   Output 600 ml   Net 221.67 ml         Physical Exam  Constitutional:       General: She is not in acute distress.     Appearance: Normal appearance.   HENT:      Head: Normocephalic and atraumatic.      Nose: Nose normal.      Mouth/Throat:      Mouth: Mucous membranes are moist.   Cardiovascular:      Rate and Rhythm: Normal rate and regular rhythm.      Pulses: Normal pulses.      Heart sounds: Normal heart sounds.   Pulmonary:      Effort: Pulmonary effort is normal. No respiratory distress.      Breath sounds: Normal breath sounds.   Abdominal:      General: Bowel sounds are  normal. There is no distension.      Palpations: There is no mass.      Tenderness: There is no guarding.      Comments: Epigastric and right upper quadrant tenderness= improving   Musculoskeletal:         General: No deformity.   Skin:     General: Skin is warm and dry.   Neurological:      General: No focal deficit present.      Mental Status: She is alert and oriented to person, place, and time.   Psychiatric:         Mood and Affect: Mood normal.             Significant Labs: All pertinent labs within the past 24 hours have been reviewed.    Significant Imaging: I have reviewed all pertinent imaging results/findings within the past 24 hours.

## 2024-07-21 LAB
ALBUMIN SERPL BCP-MCNC: 4 G/DL (ref 3.5–5.2)
ALP SERPL-CCNC: 69 U/L (ref 55–135)
ALT SERPL W/O P-5'-P-CCNC: 16 U/L (ref 10–44)
ANION GAP SERPL CALC-SCNC: 7 MMOL/L (ref 8–16)
AST SERPL-CCNC: 14 U/L (ref 10–40)
BACTERIA STL CULT: NORMAL
BASOPHILS # BLD AUTO: 0.05 K/UL (ref 0–0.2)
BASOPHILS NFR BLD: 0.5 % (ref 0–1.9)
BILIRUB SERPL-MCNC: 0.6 MG/DL (ref 0.1–1)
BUN SERPL-MCNC: 7 MG/DL (ref 6–20)
CALCIUM SERPL-MCNC: 9 MG/DL (ref 8.7–10.5)
CHLORIDE SERPL-SCNC: 108 MMOL/L (ref 95–110)
CO2 SERPL-SCNC: 26 MMOL/L (ref 23–29)
CREAT SERPL-MCNC: 0.9 MG/DL (ref 0.5–1.4)
DIFFERENTIAL METHOD BLD: ABNORMAL
EOSINOPHIL # BLD AUTO: 0.2 K/UL (ref 0–0.5)
EOSINOPHIL NFR BLD: 1.9 % (ref 0–8)
ERYTHROCYTE [DISTWIDTH] IN BLOOD BY AUTOMATED COUNT: 14.1 % (ref 11.5–14.5)
EST. GFR  (NO RACE VARIABLE): >60 ML/MIN/1.73 M^2
GLUCOSE SERPL-MCNC: 114 MG/DL (ref 70–110)
HCT VFR BLD AUTO: 41.2 % (ref 37–48.5)
HGB BLD-MCNC: 13.3 G/DL (ref 12–16)
IMM GRANULOCYTES # BLD AUTO: 0.03 K/UL (ref 0–0.04)
IMM GRANULOCYTES NFR BLD AUTO: 0.3 % (ref 0–0.5)
LYMPHOCYTES # BLD AUTO: 2.8 K/UL (ref 1–4.8)
LYMPHOCYTES NFR BLD: 28.8 % (ref 18–48)
MAGNESIUM SERPL-MCNC: 2 MG/DL (ref 1.6–2.6)
MCH RBC QN AUTO: 29.2 PG (ref 27–31)
MCHC RBC AUTO-ENTMCNC: 32.3 G/DL (ref 32–36)
MCV RBC AUTO: 91 FL (ref 82–98)
MONOCYTES # BLD AUTO: 0.8 K/UL (ref 0.3–1)
MONOCYTES NFR BLD: 8.2 % (ref 4–15)
NEUTROPHILS # BLD AUTO: 5.8 K/UL (ref 1.8–7.7)
NEUTROPHILS NFR BLD: 60.3 % (ref 38–73)
NRBC BLD-RTO: 0 /100 WBC
PHOSPHATE SERPL-MCNC: 3.4 MG/DL (ref 2.7–4.5)
PLATELET # BLD AUTO: 310 K/UL (ref 150–450)
PMV BLD AUTO: 9.1 FL (ref 9.2–12.9)
POTASSIUM SERPL-SCNC: 4.1 MMOL/L (ref 3.5–5.1)
PROT SERPL-MCNC: 6.6 G/DL (ref 6–8.4)
RBC # BLD AUTO: 4.55 M/UL (ref 4–5.4)
SODIUM SERPL-SCNC: 141 MMOL/L (ref 136–145)
WBC # BLD AUTO: 9.64 K/UL (ref 3.9–12.7)

## 2024-07-21 PROCEDURE — 63600175 PHARM REV CODE 636 W HCPCS: Performed by: INTERNAL MEDICINE

## 2024-07-21 PROCEDURE — 85025 COMPLETE CBC W/AUTO DIFF WBC: CPT | Performed by: INTERNAL MEDICINE

## 2024-07-21 PROCEDURE — 63600175 PHARM REV CODE 636 W HCPCS: Performed by: NURSE PRACTITIONER

## 2024-07-21 PROCEDURE — 83735 ASSAY OF MAGNESIUM: CPT | Performed by: INTERNAL MEDICINE

## 2024-07-21 PROCEDURE — 12000002 HC ACUTE/MED SURGE SEMI-PRIVATE ROOM

## 2024-07-21 PROCEDURE — 84100 ASSAY OF PHOSPHORUS: CPT | Performed by: INTERNAL MEDICINE

## 2024-07-21 PROCEDURE — 25000003 PHARM REV CODE 250: Performed by: NURSE PRACTITIONER

## 2024-07-21 PROCEDURE — 25000003 PHARM REV CODE 250: Performed by: INTERNAL MEDICINE

## 2024-07-21 PROCEDURE — 80053 COMPREHEN METABOLIC PANEL: CPT | Performed by: INTERNAL MEDICINE

## 2024-07-21 PROCEDURE — 36415 COLL VENOUS BLD VENIPUNCTURE: CPT | Performed by: INTERNAL MEDICINE

## 2024-07-21 RX ORDER — DIAZEPAM 10 MG/2ML
5 INJECTION INTRAMUSCULAR ONCE
Status: COMPLETED | OUTPATIENT
Start: 2024-07-21 | End: 2024-07-21

## 2024-07-21 RX ORDER — IBUPROFEN 200 MG
1 TABLET ORAL DAILY
Status: DISCONTINUED | OUTPATIENT
Start: 2024-07-21 | End: 2024-07-22 | Stop reason: HOSPADM

## 2024-07-21 RX ADMIN — ALPRAZOLAM 0.25 MG: 0.25 TABLET ORAL at 08:07

## 2024-07-21 RX ADMIN — PIPERACILLIN SODIUM AND TAZOBACTAM SODIUM 3.38 G: 3; .375 INJECTION, POWDER, LYOPHILIZED, FOR SOLUTION INTRAVENOUS at 02:07

## 2024-07-21 RX ADMIN — PIPERACILLIN SODIUM AND TAZOBACTAM SODIUM 3.38 G: 3; .375 INJECTION, POWDER, LYOPHILIZED, FOR SOLUTION INTRAVENOUS at 08:07

## 2024-07-21 RX ADMIN — ONDANSETRON 4 MG: 2 INJECTION INTRAMUSCULAR; INTRAVENOUS at 09:07

## 2024-07-21 RX ADMIN — DIAZEPAM 5 MG: 5 INJECTION, SOLUTION INTRAMUSCULAR; INTRAVENOUS at 10:07

## 2024-07-21 RX ADMIN — HYDRALAZINE HYDROCHLORIDE 10 MG: 20 INJECTION INTRAMUSCULAR; INTRAVENOUS at 08:07

## 2024-07-21 RX ADMIN — PANTOPRAZOLE SODIUM 40 MG: 40 INJECTION, POWDER, FOR SOLUTION INTRAVENOUS at 08:07

## 2024-07-21 RX ADMIN — PIPERACILLIN SODIUM AND TAZOBACTAM SODIUM 3.38 G: 3; .375 INJECTION, POWDER, LYOPHILIZED, FOR SOLUTION INTRAVENOUS at 04:07

## 2024-07-21 NOTE — NURSING
Patient did well overnight with no new complaints of pain. Can we try full liquid diet this morning? Notified Dr Post, new orders rec'd for full liquid diet.

## 2024-07-21 NOTE — PROGRESS NOTES
[facility] Medicine  Progress Note    Patient Name: Kala Wyatt  MRN: [id number]  Patient Class: OP- Observation   Admission Date: 7/19/2024  Length of Stay: 0 days  Attending Physician: Darrius Garcia MD  Primary Care Provider: Basilio Isaac MD        Subjective:     Principal Problem:Colitis        HPI:  58 year old female with a past medical history of anxiety, COVID, depression, GERD, hyperlipidemia, tobacco and marijuana smoker whom presents to the emergency room for reports of abdominal pain. Reports left upper abdominal pain, constant. Started at 0100. Crampy diffuse with multiple episodes of loose watery stool which she reports had maroon colored blood.  Denies vomiting, chest pain, shortness of breath, dysuria.  Upon arrival to the emergency room, patient hypertensive at 193/103.  WBC 15.24, hemoconcentrated with a hemoglobin 16.6 hematocrit 50 glucose 129, TSH 2.24, negative troponins CPK and BNP.  Group a strep negative flu and COVID negative C diff negative occult blood positive rotavirus negative.  Chest x-ray negative CT abdomen with severe colitis.  IV Zosyn given in ER, admit to hospital medicine.  Trend H and H monitor for overt bleeding continue IV antibiotics IV fluids pain control antiemetics               Overview/Hospital Course:  No notes on file    Interval History:  Patient awake alert in no acute distress.  Remains afebrile.  Denies chest pain shortness on breath.  Denies nausea vomiting.  Abdominal pain much improved today.  No tenderness to palpation either.  Tolerated clear liquid diet.  We will advance to a full liquid diet today as tolerated, per GI recommendations.  Patient is ambulatory.  Case and plan discussed with patient's daughter who is a nurse here    Review of Systems   Constitutional:  Negative for activity change, fatigue and fever.   Respiratory:  Negative for cough and shortness of breath.    Cardiovascular:  Negative for chest pain  and leg swelling.   Gastrointestinal:  Negative for abdominal pain, nausea and vomiting.   All other systems reviewed and are negative.    Objective:     Vital Signs (Most Recent):  Temp: 98.2 °F (36.8 °C) (07/21/24 0820)  Pulse: 70 (07/21/24 0820)  Resp: 19 (07/21/24 0820)  BP: (!) 119/96 (07/21/24 0820)  SpO2: 96 % (07/21/24 0820) Vital Signs (24h Range):  Temp:  [97.6 °F (36.4 °C)-98.2 °F (36.8 °C)] 98.2 °F (36.8 °C)  Pulse:  [60-70] 70  Resp:  [18-19] 19  SpO2:  [96 %-97 %] 96 %  BP: (119-177)/(69-96) 119/96     Weight: 69.2 kg (152 lb 9.6 oz)  Body mass index is 27.03 kg/m².    Intake/Output Summary (Last 24 hours) at 7/21/2024 0930  Last data filed at 7/21/2024 0439  Gross per 24 hour   Intake 1510 ml   Output --   Net 1510 ml         Physical Exam  Constitutional:       General: She is not in acute distress.     Appearance: Normal appearance.   HENT:      Head: Normocephalic and atraumatic.      Nose: Nose normal.      Mouth/Throat:      Mouth: Mucous membranes are moist.   Cardiovascular:      Rate and Rhythm: Normal rate and regular rhythm.      Pulses: Normal pulses.      Heart sounds: Normal heart sounds.   Pulmonary:      Effort: Pulmonary effort is normal. No respiratory distress.      Breath sounds: Normal breath sounds.   Abdominal:      General: Bowel sounds are normal. There is no distension.      Tenderness: There is no abdominal tenderness.   Neurological:      General: No focal deficit present.      Mental Status: She is alert and oriented to person, place, and time.   Psychiatric:         Mood and Affect: Mood normal.             Significant Labs: All pertinent labs within the past 24 hours have been reviewed.    Significant Imaging: I have reviewed all pertinent imaging results/findings within the past 24 hours.    Assessment/Plan:      * Colitis  -Improving clinically   -afebrile   -monitor leukocytosis = resolved   -CT abdomen and pelvis with contrast on 07/19/2024 =1. Diffuse colonic wall  thickening with suboptimal evaluation of colon due to collapsed state.  Although this could be physiologic in nature, the appearance is suspicious for infectious or inflammatory colitis in the appropriate clinical setting.  Clinical and laboratory correlation is requested.2. Multiple hepatic steatosis.  -C diff stool on 07/19/2024 = negative  -stool WBC on 07/19/2004 = negative  -stool culture on 07/19/2024 = negative   -blood cultures x2 on 07/19/2024 = negative x2 day.    -Zosyn IV empirically.  Ordered on 07/19/2024  -status post LR IV at 100 cc per hour   -DC normal saline IV at 75 cc an hour x1 L and then stop.  Ordered on 07/20/2024  -GI consulted = follow recommendations  -tolerating clear liquid diet.  Advance to full liquid diet per GI recommendation  -discharge home when tolerating soft diet and if okay by GI        Blood in stool  -likely due to colitis   -FOBT on 07/19/2024 = positive   -monitor H&H = stable range but with some decrease (likely due to dilutional effect of IV fluids)  -PPI daily   -consider transfusion 1 unit PRBC if and when hemoglobin less than 7  -GI consulted = follow recommendations      Essential hypertension  -monitor blood pressure = stable range   -covered with hydralazine 10 mg IV p.r.n.   -adjust BP medication as needed         VTE Risk Mitigation (From admission, onward)           Ordered     Place sequential compression device  Until discontinued         07/20/24 0858                    Discharge Planning   ADELAIDE:      Code Status: Full Code   Is the patient medically ready for discharge?:     Reason for patient still in hospital (select all that apply): Patient trending condition and Pending disposition  Discharge Plan A: Home          Possible discharge home in a.m. if tolerating soft bland diet.  GI has cleared patient to be discharged home tomorrow if tolerating soft bland diet          Darrius Garcia MD  Department of Hospital Medicine   [facility]

## 2024-07-21 NOTE — SUBJECTIVE & OBJECTIVE
Interval History:  Patient awake alert in no acute distress.  Remains afebrile.  Denies chest pain shortness on breath.  Denies nausea vomiting.  Abdominal pain much improved today.  No tenderness to palpation either.  Tolerated clear liquid diet.  We will advance to a full liquid diet today as tolerated, per GI recommendations.  Patient is ambulatory.  Case and plan discussed with patient's daughter who is a nurse here    Review of Systems   Constitutional:  Negative for activity change, fatigue and fever.   Respiratory:  Negative for cough and shortness of breath.    Cardiovascular:  Negative for chest pain and leg swelling.   Gastrointestinal:  Negative for abdominal pain, nausea and vomiting.   All other systems reviewed and are negative.    Objective:     Vital Signs (Most Recent):  Temp: 98.2 °F (36.8 °C) (07/21/24 0820)  Pulse: 70 (07/21/24 0820)  Resp: 19 (07/21/24 0820)  BP: (!) 119/96 (07/21/24 0820)  SpO2: 96 % (07/21/24 0820) Vital Signs (24h Range):  Temp:  [97.6 °F (36.4 °C)-98.2 °F (36.8 °C)] 98.2 °F (36.8 °C)  Pulse:  [60-70] 70  Resp:  [18-19] 19  SpO2:  [96 %-97 %] 96 %  BP: (119-177)/(69-96) 119/96     Weight: 69.2 kg (152 lb 9.6 oz)  Body mass index is 27.03 kg/m².    Intake/Output Summary (Last 24 hours) at 7/21/2024 0930  Last data filed at 7/21/2024 0439  Gross per 24 hour   Intake 1510 ml   Output --   Net 1510 ml         Physical Exam  Constitutional:       General: She is not in acute distress.     Appearance: Normal appearance.   HENT:      Head: Normocephalic and atraumatic.      Nose: Nose normal.      Mouth/Throat:      Mouth: Mucous membranes are moist.   Cardiovascular:      Rate and Rhythm: Normal rate and regular rhythm.      Pulses: Normal pulses.      Heart sounds: Normal heart sounds.   Pulmonary:      Effort: Pulmonary effort is normal. No respiratory distress.      Breath sounds: Normal breath sounds.   Abdominal:      General: Bowel sounds are normal. There is no distension.       Tenderness: There is no abdominal tenderness.   Neurological:      General: No focal deficit present.      Mental Status: She is alert and oriented to person, place, and time.   Psychiatric:         Mood and Affect: Mood normal.             Significant Labs: All pertinent labs within the past 24 hours have been reviewed.    Significant Imaging: I have reviewed all pertinent imaging results/findings within the past 24 hours.

## 2024-07-21 NOTE — ASSESSMENT & PLAN NOTE
-likely due to colitis   -FOBT on 07/19/2024 = positive   -monitor H&H = stable range but with some decrease (likely due to dilutional effect of IV fluids)  -PPI daily   -consider transfusion 1 unit PRBC if and when hemoglobin less than 7  -GI consulted = follow recommendations

## 2024-07-21 NOTE — ASSESSMENT & PLAN NOTE
-Improving clinically   -afebrile   -monitor leukocytosis = resolved   -CT abdomen and pelvis with contrast on 07/19/2024 =1. Diffuse colonic wall thickening with suboptimal evaluation of colon due to collapsed state.  Although this could be physiologic in nature, the appearance is suspicious for infectious or inflammatory colitis in the appropriate clinical setting.  Clinical and laboratory correlation is requested.2. Multiple hepatic steatosis.  -C diff stool on 07/19/2024 = negative  -stool WBC on 07/19/2004 = negative  -stool culture on 07/19/2024 = negative   -blood cultures x2 on 07/19/2024 = negative x2 day.    -Zosyn IV empirically.  Ordered on 07/19/2024  -status post LR IV at 100 cc per hour   -DC normal saline IV at 75 cc an hour x1 L and then stop.  Ordered on 07/20/2024  -GI consulted = follow recommendations  -tolerating clear liquid diet.  Advance to full liquid diet per GI recommendation  -discharge home when tolerating soft diet and if okay by GI

## 2024-07-22 VITALS
TEMPERATURE: 98 F | HEART RATE: 57 BPM | BODY MASS INDEX: 27.04 KG/M2 | DIASTOLIC BLOOD PRESSURE: 86 MMHG | WEIGHT: 152.63 LBS | RESPIRATION RATE: 19 BRPM | SYSTOLIC BLOOD PRESSURE: 154 MMHG | HEIGHT: 63 IN | OXYGEN SATURATION: 95 %

## 2024-07-22 LAB
ALBUMIN SERPL BCP-MCNC: 4.6 G/DL (ref 3.5–5.2)
ALP SERPL-CCNC: 75 U/L (ref 55–135)
ALT SERPL W/O P-5'-P-CCNC: 17 U/L (ref 10–44)
ANION GAP SERPL CALC-SCNC: 9 MMOL/L (ref 8–16)
AST SERPL-CCNC: 15 U/L (ref 10–40)
BASOPHILS # BLD AUTO: 0.05 K/UL (ref 0–0.2)
BASOPHILS NFR BLD: 0.4 % (ref 0–1.9)
BILIRUB SERPL-MCNC: 0.5 MG/DL (ref 0.1–1)
BUN SERPL-MCNC: 8 MG/DL (ref 6–20)
CALCIUM SERPL-MCNC: 9.8 MG/DL (ref 8.7–10.5)
CHLORIDE SERPL-SCNC: 106 MMOL/L (ref 95–110)
CO2 SERPL-SCNC: 26 MMOL/L (ref 23–29)
CREAT SERPL-MCNC: 0.8 MG/DL (ref 0.5–1.4)
DIFFERENTIAL METHOD BLD: ABNORMAL
EOSINOPHIL # BLD AUTO: 0.1 K/UL (ref 0–0.5)
EOSINOPHIL NFR BLD: 0.5 % (ref 0–8)
ERYTHROCYTE [DISTWIDTH] IN BLOOD BY AUTOMATED COUNT: 14.1 % (ref 11.5–14.5)
EST. GFR  (NO RACE VARIABLE): >60 ML/MIN/1.73 M^2
GLUCOSE SERPL-MCNC: 120 MG/DL (ref 70–110)
HCT VFR BLD AUTO: 43.7 % (ref 37–48.5)
HGB BLD-MCNC: 14.4 G/DL (ref 12–16)
IMM GRANULOCYTES # BLD AUTO: 0.04 K/UL (ref 0–0.04)
IMM GRANULOCYTES NFR BLD AUTO: 0.3 % (ref 0–0.5)
LYMPHOCYTES # BLD AUTO: 2.5 K/UL (ref 1–4.8)
LYMPHOCYTES NFR BLD: 19.2 % (ref 18–48)
MAGNESIUM SERPL-MCNC: 2 MG/DL (ref 1.6–2.6)
MCH RBC QN AUTO: 29.4 PG (ref 27–31)
MCHC RBC AUTO-ENTMCNC: 33 G/DL (ref 32–36)
MCV RBC AUTO: 89 FL (ref 82–98)
MONOCYTES # BLD AUTO: 0.7 K/UL (ref 0.3–1)
MONOCYTES NFR BLD: 5.4 % (ref 4–15)
NEUTROPHILS # BLD AUTO: 9.5 K/UL (ref 1.8–7.7)
NEUTROPHILS NFR BLD: 74.2 % (ref 38–73)
NRBC BLD-RTO: 0 /100 WBC
OHS QRS DURATION: 88 MS
OHS QTC CALCULATION: 443 MS
PHOSPHATE SERPL-MCNC: 3.4 MG/DL (ref 2.7–4.5)
PLATELET # BLD AUTO: 374 K/UL (ref 150–450)
PMV BLD AUTO: 9.4 FL (ref 9.2–12.9)
POTASSIUM SERPL-SCNC: 3.7 MMOL/L (ref 3.5–5.1)
PROT SERPL-MCNC: 7.7 G/DL (ref 6–8.4)
RBC # BLD AUTO: 4.89 M/UL (ref 4–5.4)
SODIUM SERPL-SCNC: 141 MMOL/L (ref 136–145)
WBC # BLD AUTO: 12.84 K/UL (ref 3.9–12.7)

## 2024-07-22 PROCEDURE — 63600175 PHARM REV CODE 636 W HCPCS: Performed by: INTERNAL MEDICINE

## 2024-07-22 PROCEDURE — 25000003 PHARM REV CODE 250: Performed by: NURSE PRACTITIONER

## 2024-07-22 PROCEDURE — 84100 ASSAY OF PHOSPHORUS: CPT | Performed by: INTERNAL MEDICINE

## 2024-07-22 PROCEDURE — 63600175 PHARM REV CODE 636 W HCPCS: Performed by: NURSE PRACTITIONER

## 2024-07-22 PROCEDURE — 85025 COMPLETE CBC W/AUTO DIFF WBC: CPT | Performed by: INTERNAL MEDICINE

## 2024-07-22 PROCEDURE — 80053 COMPREHEN METABOLIC PANEL: CPT | Performed by: INTERNAL MEDICINE

## 2024-07-22 PROCEDURE — 83735 ASSAY OF MAGNESIUM: CPT | Performed by: INTERNAL MEDICINE

## 2024-07-22 RX ORDER — AMOXICILLIN AND CLAVULANATE POTASSIUM 875; 125 MG/1; MG/1
1 TABLET, FILM COATED ORAL EVERY 12 HOURS
Qty: 10 TABLET | Refills: 0 | Status: SHIPPED | OUTPATIENT
Start: 2024-07-22 | End: 2024-07-27

## 2024-07-22 RX ADMIN — PIPERACILLIN SODIUM AND TAZOBACTAM SODIUM 3.38 G: 3; .375 INJECTION, POWDER, LYOPHILIZED, FOR SOLUTION INTRAVENOUS at 02:07

## 2024-07-22 RX ADMIN — PANTOPRAZOLE SODIUM 40 MG: 40 INJECTION, POWDER, FOR SOLUTION INTRAVENOUS at 09:07

## 2024-07-22 NOTE — NURSING
Discharge instructions review with pt... verbalized understanding. Iv removed without difficulty, catheter intact. Discharged to personal vehicle with spouse in stable condition.

## 2024-07-22 NOTE — NURSING
/89 P59 Hydralazine 10mg IV given.Had to be re-educated on importance of getting medication to lower her BP because of initial refusal. She then agreed to receive Hydralazine however began shaking, speaking fast, c/o chest pain, asking multiple questions back to back. Agreed she was anxious. EKG done with noted normal sinus rhythm. Admininstered one time dose of Xanax 0.25. BP rechecked 147/82 P79. Pt continues to speak of chest tightness along with anxiety. Encouragement and support given. Lights turned low. Md made aware.

## 2024-07-22 NOTE — PLAN OF CARE
Discharge orders and chart reviewed. No other discharge needs noted at this time. Pt is clear for discharge from case management. Pt is discharging to home.    Significant other to transport.     Patient attempted to re-schedule hospital follow up appointment with PCP, no answer from clinic. Patient to follow up as previously scheduled     07/22/24 1312   Final Note   Assessment Type Final Discharge Note   Anticipated Discharge Disposition Home   What phone number can be called within the next 1-3 days to see how you are doing after discharge? [phone]   Hospital Resources/Appts/Education Provided Appointment suggestion unavailable   Post-Acute Status   Discharge Delays (!) Personal Transportation

## 2024-07-22 NOTE — PROGRESS NOTES
Pt clear for DC from case management standpoint. Discharging to home.      07/22/24 1301   Discharge Assessment   Assessment Type Final Discharge Note

## 2024-07-22 NOTE — DISCHARGE SUMMARY
[facility] Medicine  Discharge Summary      Patient Name: Kala Wyatt  MRN: [id number]  [facility]: [id number]  Patient Class: IP- Inpatient  Admission Date: 7/19/2024  Hospital Length of Stay: 1 days  Discharge Date and Time: 7/22/2024  2:37 PM  Attending Physician: No att. providers found   Discharging Provider: Danny Chadwick MD  Primary Care Provider: Basilio Isaac MD    Primary Care Team: Networked reference to record PCT     HPI:   58 year old female with a past medical history of anxiety, COVID, depression, GERD, hyperlipidemia, tobacco and marijuana smoker whom presents to the emergency room for reports of abdominal pain. Reports left upper abdominal pain, constant. Started at 0100. Crampy diffuse with multiple episodes of loose watery stool which she reports had maroon colored blood.  Denies vomiting, chest pain, shortness of breath, dysuria.  Upon arrival to the emergency room, patient hypertensive at 193/103.  WBC 15.24, hemoconcentrated with a hemoglobin 16.6 hematocrit 50 glucose 129, TSH 2.24, negative troponins CPK and BNP.  Group a strep negative flu and COVID negative C diff negative occult blood positive rotavirus negative.  Chest x-ray negative CT abdomen with severe colitis.  IV Zosyn given in ER, admit to hospital medicine.  Trend H and H monitor for overt bleeding continue IV antibiotics IV fluids pain control antiemetics               * No surgery found *      Hospital Course:   Patient admitted to hospital for further management, patient was evaluated by gastroenterologist recommended conservative management of the IV fluids and IV Zosyn, patient was ruled out C diff infection, stool culture/blood culture all negative, patient reports much improvement with IV antibiotics, tolerated diet well, cleared for discharge.      Goals of Care Treatment Preferences:  Code Status: Full Code      Consults:   Consults (From admission, onward)          Status Ordering Provider      Inpatient consult to Gastroenterology  Once        Provider:  Mata Post MD    Completed CARLTON HOOVER            Cardiac/Vascular  Essential hypertension  -monitor blood pressure = stable range   -covered with hydralazine 10 mg IV p.r.n.   -adjust BP medication as needed       GI  * Colitis  -Improving clinically   -afebrile   -monitor leukocytosis = resolved   -CT abdomen and pelvis with contrast on 07/19/2024 =1. Diffuse colonic wall thickening with suboptimal evaluation of colon due to collapsed state.  Although this could be physiologic in nature, the appearance is suspicious for infectious or inflammatory colitis in the appropriate clinical setting.  Clinical and laboratory correlation is requested.2. Multiple hepatic steatosis.  -C diff stool on 07/19/2024 = negative  -stool WBC on 07/19/2004 = negative  -stool culture on 07/19/2024 = negative   -blood cultures x2 on 07/19/2024 = negative x2 day.    -Zosyn IV empirically.  Ordered on 07/19/2024  -status post LR IV at 100 cc per hour   -DC normal saline IV at 75 cc an hour x1 L and then stop.  Ordered on 07/20/2024  -GI consulted = follow recommendations  -tolerating clear liquid diet.  Advance to full liquid diet per GI recommendation  -discharge home when tolerating soft diet and if okay by GI        Blood in stool  -likely due to colitis   -FOBT on 07/19/2024 = positive   -monitor H&H = stable range but with some decrease (likely due to dilutional effect of IV fluids)  -PPI daily   -consider transfusion 1 unit PRBC if and when hemoglobin less than 7  -GI consulted = follow recommendations        Final Active Diagnoses:    Diagnosis Date Noted POA    PRINCIPAL PROBLEM:  Colitis [K52.9] 07/19/2024 Yes    Blood in stool [K92.1] 07/20/2024 Yes    Essential hypertension [I10] 07/19/2024 Yes      Problems Resolved During this Admission:       Discharged Condition: stable    Disposition: Home or Self Care    Follow Up:   Follow-up Information        Basilio Isaac MD. Schedule an appointment as soon as possible for a visit in 1 week(s).    Specialty: Internal Medicine  Why: follow up as previously scheduled  Contact information:  [address] [address] [address] [phone]                           Patient Instructions:      Ambulatory referral/consult to Smoking Cessation Program   Standing Status: Future   Referral Priority: Routine Referral Type: Consultation   Referral Reason: Specialty Services Required   Requested Specialty: CTTS   Number of Visits Requested: 1     Reason for not Prescribing Nicotine Replacement     Order Specific Question Answer Comments   Reason for not Prescribing: Not medically appropriate at this time        Significant Diagnostic Studies: Labs: CMP   Recent Labs   Lab 07/21/24  0453 07/22/24  0213    141   K 4.1 3.7    106   CO2 26 26   * 120*   BUN 7 8   CREATININE 0.9 0.8   CALCIUM 9.0 9.8   PROT 6.6 7.7   ALBUMIN 4.0 4.6   BILITOT 0.6 0.5   ALKPHOS 69 75   AST 14 15   ALT 16 17   ANIONGAP 7* 9    and CBC   Recent Labs   Lab 07/21/24 0453 07/22/24  0213   WBC 9.64 12.84*   HGB 13.3 14.4   HCT 41.2 43.7    374       Pending Diagnostic Studies:       Procedure Component Value Units Date/Time    EKG 12-lead [[id number]]     Order Status: Sent Lab Status: No result     Stool Exam-Ova,Cysts,Parasites [[id number]] Collected: 07/19/24 1127    Order Status: Sent Lab Status: In process Updated: 07/19/24 1151    Specimen: Stool            Medications:  Reconciled Home Medications:      Medication List        START taking these medications      amoxicillin-clavulanate 875-125mg 875-125 mg per tablet  Commonly known as: AUGMENTIN  Take 1 tablet by mouth every 12 (twelve) hours. for 5 days            CONTINUE taking these medications      atorvastatin 80 MG tablet  Commonly known as: LIPITOR  Take 80 mg by mouth once daily.     diclofenac sodium 1 % Gel  Commonly known as: VOLTAREN  Apply 1  Application topically 4 (four) times daily.     meloxicam 15 MG tablet  Commonly known as: MOBIC  Take 15 mg by mouth daily as needed for Pain.     methocarbamoL 500 MG Tab  Commonly known as: ROBAXIN  Take 500 mg by mouth 3 (three) times daily as needed (Muscle Spasms).            X-Ray Chest AP Portable    Result Date: 7/19/2024  EXAMINATION: XR CHEST AP PORTABLE CLINICAL HISTORY: abdominal pain; FINDINGS: Portable chest at 12:45 without comparisons shows normal cardiomediastinal silhouette. Lungs are clear. Pulmonary vasculature is normal. No acute osseous abnormality.     No acute cardiopulmonary abnormality. Electronically signed by: Yamel Hampton Date:    07/19/2024 Time:    12:55    CT Abdomen Pelvis With IV Contrast NO Oral Contrast    Result Date: 7/19/2024  EXAMINATION: CT ABDOMEN PELVIS WITH IV CONTRAST CLINICAL HISTORY: Abdominal pain, acute, nonlocalized; TECHNIQUE: CT abdomen and pelvis with 100 mL Omnipaque 350. COMPARISON: 10/22/2023 FINDINGS: CT ABDOMEN: Visualized lung bases are clear. Mild diffuse hepatic steatosis unchanged along with subcapsular left hepatic lobe 7 mm hypodensity suggesting a cyst.  Subcapsular hypodensity posteriorly in right hepatic lobe likewise unchanged and somewhat ill-defined. Gallbladder, pancreas, spleen, adrenals, and kidneys are normal.  Mild aortoiliac calcifications are present. There is mild diffuse wall prominence of the colon without pericolonic inflammatory fat stranding, with colon predominantly collapsed.  A normal appendix is present.  No intestinal abnormality otherwise identified.  No free intraperitoneal gas or fluid. No acute osseous abnormality.  Degenerative changes affect the spine. CT PELVIS: Bladder is decompressed.  Uterus has been removed.  Left ovary is normal.  No free pelvic fluid.  No acute osseous abnormality.     1. Diffuse colonic wall thickening with suboptimal evaluation of colon due to collapsed state.  Although this could be  physiologic in nature, the appearance is suspicious for infectious or inflammatory colitis in the appropriate clinical setting.  Clinical and laboratory correlation is requested. 2. Multiple hepatic steatosis. Electronically signed by: Edis Morgan Date:    07/19/2024 Time:    12:41  - pulls last radiology orders      Indwelling Lines/Drains at time of discharge:   Lines/Drains/Airways       None                   Time spent on the discharge of patient: 35 minutes         Danny Chadwick MD  Department of Hospital Medicine  [facility]

## 2024-07-22 NOTE — PLAN OF CARE
Problem: Adult Inpatient Plan of Care  Goal: Optimal Comfort and Wellbeing  7/22/2024 1305 by Deepika Bassett LPN  Outcome: Met  7/22/2024 1304 by Deepika Bassett LPN  Outcome: Progressing

## 2024-07-22 NOTE — NURSING
Pt expressed she has calmed a great deal and is feeling better. Informed to call for any changes. Voiced understanding .

## 2024-07-22 NOTE — HOSPITAL COURSE
Patient admitted to hospital for further management, patient was evaluated by gastroenterologist recommended conservative management of the IV fluids and IV Zosyn, patient was ruled out C diff infection, stool culture/blood culture all negative, patient reports much improvement with IV antibiotics, tolerated diet well, cleared for discharge.

## 2024-07-24 LAB
BACTERIA BLD CULT: NORMAL
BACTERIA BLD CULT: NORMAL

## 2024-09-04 ENCOUNTER — TELEPHONE (OUTPATIENT)
Dept: SMOKING CESSATION | Facility: CLINIC | Age: 59
End: 2024-09-04
Payer: MEDICAID

## 2024-09-04 NOTE — TELEPHONE ENCOUNTER
Attempted to call patient to reschedule their smoking cessation appointment. Left a message with my contact information to reschedule the appointment. Margy Sam [phone].